# Patient Record
Sex: FEMALE | Race: WHITE | NOT HISPANIC OR LATINO | Employment: FULL TIME | URBAN - METROPOLITAN AREA
[De-identification: names, ages, dates, MRNs, and addresses within clinical notes are randomized per-mention and may not be internally consistent; named-entity substitution may affect disease eponyms.]

---

## 2018-03-08 ENCOUNTER — OFFICE VISIT (OUTPATIENT)
Dept: FAMILY MEDICINE CLINIC | Facility: CLINIC | Age: 63
End: 2018-03-08
Payer: COMMERCIAL

## 2018-03-08 VITALS
HEART RATE: 76 BPM | DIASTOLIC BLOOD PRESSURE: 72 MMHG | SYSTOLIC BLOOD PRESSURE: 148 MMHG | RESPIRATION RATE: 16 BRPM | WEIGHT: 232 LBS

## 2018-03-08 DIAGNOSIS — Z00.00 ANNUAL PHYSICAL EXAM: Primary | ICD-10-CM

## 2018-03-08 DIAGNOSIS — I73.00 RAYNAUD'S DISEASE WITHOUT GANGRENE: ICD-10-CM

## 2018-03-08 DIAGNOSIS — Z01.419 WELL FEMALE EXAM WITH ROUTINE GYNECOLOGICAL EXAM: ICD-10-CM

## 2018-03-08 PROCEDURE — 99386 PREV VISIT NEW AGE 40-64: CPT | Performed by: FAMILY MEDICINE

## 2018-03-08 RX ORDER — DILTIAZEM HYDROCHLORIDE 60 MG/1
60 CAPSULE, EXTENDED RELEASE ORAL 2 TIMES DAILY
Qty: 60 CAPSULE | Refills: 1 | Status: SHIPPED | OUTPATIENT
Start: 2018-03-08 | End: 2018-03-12

## 2018-03-08 RX ORDER — DILTIAZEM HYDROCHLORIDE 60 MG/1
60 TABLET, FILM COATED ORAL
COMMUNITY
End: 2018-03-08

## 2018-03-08 NOTE — PROGRESS NOTES
Subjective:      Patient ID: London Bowser is a 63 y o  female  60-year-old female presents as a new patient to the practice with her   Patient recently moved from [place], [place] to be closer to her son and grandchildren  She is generally in  Good health with the exception of having renal at syndrome of her hands and feet  She had had extensive workup through Orthopedic surgery then Rheumatology  She reportedly had extensive testing and was eventually placed on Cardizem which improved her symptoms tremendously  Since they moved she has not had the Cardizem and her Raynaud's has been more noticeable this winter  Reportedly she had her last colonoscopy approximately 6 years ago which was completely normal   Last mammogram was over 1 year ago and last Pap smear was over 2 years ago  She does need to find a new gynecologist   No history of dysphagia, esophageal dysmotility, sclerodactyly or telangiectasias        No past medical history on file  No family history on file  No past surgical history on file  reports that she has never smoked  She has never used smokeless tobacco  She reports that she drinks alcohol  She reports that she does not use drugs  Current Outpatient Prescriptions:     diltiazem (CARDIZEM SR) 60 mg 12 hr capsule, Take 1 capsule (60 mg total) by mouth 2 (two) times a day, Disp: 60 capsule, Rfl: 1    The following portions of the patient's history were reviewed and updated as appropriate: allergies, current medications, past family history, past medical history, past social history, past surgical history and problem list     Review of Systems   Constitutional: Negative  HENT: Negative  Eyes: Negative  Respiratory: Negative  Cardiovascular: Negative  Gastrointestinal: Negative  Endocrine: Negative  Genitourinary: Negative  Musculoskeletal: Negative  Skin: Positive for pallor  Color change:  and pain with cold weather ofDits  Allergic/Immunologic: Negative  Neurological: Negative  Hematological: Negative  Psychiatric/Behavioral: Negative  Objective:    /72 (BP Location: Right arm)   Pulse 76   Resp 16   Wt 105 kg (232 lb)      Physical Exam   Constitutional: She is oriented to person, place, and time  She appears well-developed and well-nourished  Overweight   HENT:   Head: Normocephalic and atraumatic  Eyes: Conjunctivae are normal  Pupils are equal, round, and reactive to light  Neck: Normal range of motion  Neck supple  Cardiovascular: Normal rate, regular rhythm, intact distal pulses and normal pulses  Murmur heard  Systolic murmur is present with a grade of 1/6   Pulmonary/Chest: Effort normal and breath sounds normal    Abdominal: Soft  Bowel sounds are normal    Neurological: She is alert and oriented to person, place, and time  She has normal strength and normal reflexes  No cranial nerve deficit or sensory deficit  Reflex Scores:       Tricep reflexes are 2+ on the right side and 2+ on the left side  Bicep reflexes are 2+ on the right side and 2+ on the left side  Brachioradialis reflexes are 2+ on the right side and 2+ on the left side  Patellar reflexes are 2+ on the right side and 2+ on the left side  Achilles reflexes are 2+ on the right side and 2+ on the left side  Psychiatric: She has a normal mood and affect  Her behavior is normal  Judgment and thought content normal    Nursing note and vitals reviewed            Assessment/Plan:           Problem List Items Addressed This Visit     Raynaud's disease    Relevant Medications    diltiazem (CARDIZEM SR) 60 mg 12 hr capsule    Other Relevant Orders    CBC and differential    Comprehensive metabolic panel    Lipid panel    TSH, 3rd generation with T4 reflex    Vitamin D 25 hydroxy    Well female exam with routine gynecological exam      Patient given referral to [facility] gynecology for [facility]         Relevant Orders    Ambulatory referral to Obstetrics / Gynecology    Annual physical exam - Primary      Patient seems to generally be in good health  Check old records from prior PCP  Reportedly she is current with screening colonoscopy  Check screening labs including CBC, CMP, TSH, lipid profile and vitamin-D level    Will contact with results         Relevant Orders    CBC and differential    Comprehensive metabolic panel    Lipid panel    TSH, 3rd generation with T4 reflex    Vitamin D 25 hydroxy

## 2018-03-08 NOTE — ASSESSMENT & PLAN NOTE
Patient is reportedly done well with calcium channel blocker in regards to her Raynaud's disease  I will place the patient on Cardizem SR 60 mg twice daily  I explained the mechanism of action and the benefit of having a sustained release formulation as opposed to immediate release    If this is effective for the patient without any side effects then she can contact the office and I will provide a 3 month supply with refill

## 2018-03-08 NOTE — ASSESSMENT & PLAN NOTE
Patient seems to generally be in good health  Check old records from prior PCP  Reportedly she is current with screening colonoscopy  Check screening labs including CBC, CMP, TSH, lipid profile and vitamin-D level    Will contact with results

## 2018-03-12 ENCOUNTER — OFFICE VISIT (OUTPATIENT)
Dept: FAMILY MEDICINE CLINIC | Facility: CLINIC | Age: 63
End: 2018-03-12
Payer: COMMERCIAL

## 2018-03-12 VITALS
RESPIRATION RATE: 16 BRPM | HEART RATE: 68 BPM | WEIGHT: 232 LBS | SYSTOLIC BLOOD PRESSURE: 158 MMHG | DIASTOLIC BLOOD PRESSURE: 84 MMHG | HEIGHT: 67 IN | BODY MASS INDEX: 36.41 KG/M2 | OXYGEN SATURATION: 98 %

## 2018-03-12 DIAGNOSIS — I73.00 RAYNAUD'S DISEASE WITHOUT GANGRENE: ICD-10-CM

## 2018-03-12 DIAGNOSIS — R42 DIZZINESS: Primary | ICD-10-CM

## 2018-03-12 PROCEDURE — 99213 OFFICE O/P EST LOW 20 MIN: CPT | Performed by: FAMILY MEDICINE

## 2018-03-12 RX ORDER — DILTIAZEM HYDROCHLORIDE 60 MG/1
60 TABLET, FILM COATED ORAL 3 TIMES DAILY
Qty: 90 TABLET | Refills: 0 | Status: SHIPPED | OUTPATIENT
Start: 2018-03-12 | End: 2018-04-04 | Stop reason: SDUPTHER

## 2018-03-12 NOTE — PROGRESS NOTES
Subjective:      Patient ID: Bobbi Jimenez is a 63 y o  female  Patient presents complaining of dizziness for the past 4 days  She states that she has been feeling very lightheaded since being placed on extended release diltiazem back on March 8th  Previously she was used to taking immediate release diltiazem once per day for her Raynaud syndrome  Patient states that her renal months seems to be better but she cannot tolerate the dizziness  No syncopal episodes  It does not feel as though the room is spinning  No blurring of vision  Patient's blood pressure is actually been more elevated but she is uncertain if this is related to the stress of feeling dizzy and not well  Past Medical History:   Diagnosis Date    Raynaud phenomenon        No family history on file  No past surgical history on file  reports that she has never smoked  She has never used smokeless tobacco  She reports that she drinks alcohol  She reports that she does not use drugs  Current Outpatient Prescriptions:     diltiazem (CARDIZEM) 60 mg tablet, Take 1 tablet (60 mg total) by mouth 3 (three) times a day replacing extended release formulation, Disp: 90 tablet, Rfl: 0    The following portions of the patient's history were reviewed and updated as appropriate: allergies, current medications, past family history, past medical history, past social history, past surgical history and problem list     Review of Systems   Constitutional: Negative  Respiratory: Negative  Cardiovascular: Negative  Negative for chest pain and leg swelling  Neurological: Positive for dizziness and light-headedness  Negative for tremors, syncope and weakness  Objective:    /84   Pulse 68   Resp 16   Ht 5' 7" (1 702 m)   Wt 105 kg (232 lb)   SpO2 98%   BMI 36 34 kg/m²      Physical Exam   Constitutional: She is oriented to person, place, and time  She appears well-developed     HENT:   Right Ear: Tympanic membrane and external ear normal    Left Ear: Tympanic membrane and external ear normal    Eyes: Pupils are equal, round, and reactive to light  No nystagmus   Neck: Normal range of motion  Neck supple  No thyromegaly present  Cardiovascular: Normal rate, regular rhythm and normal heart sounds  Neurological: She is alert and oriented to person, place, and time  No cranial nerve deficit  Coordination normal          No results found for this or any previous visit (from the past 1008 hour(s))  Assessment/Plan:    No problem-specific Assessment & Plan notes found for this encounter  Problem List Items Addressed This Visit     Raynaud's disease    Relevant Medications    diltiazem (CARDIZEM) 60 mg tablet    Dizziness - Primary      Patient is not becoming hypotensive  Blood pressure is more elevated  She has sensation of dizziness which is clearly associated with being placed on to extended release diltiazem  We will discontinue this  She will go back on to immediate release diltiazem 60 mg starting with 1 daily and we will see how her blood pressure and dizziness respond    She will follow up in the office to re-evaluate blood pressure

## 2018-03-12 NOTE — ASSESSMENT & PLAN NOTE
Patient is not becoming hypotensive  Blood pressure is more elevated  She has sensation of dizziness which is clearly associated with being placed on to extended release diltiazem  We will discontinue this  She will go back on to immediate release diltiazem 60 mg starting with 1 daily and we will see how her blood pressure and dizziness respond    She will follow up in the office to re-evaluate blood pressure

## 2018-04-04 ENCOUNTER — OFFICE VISIT (OUTPATIENT)
Dept: FAMILY MEDICINE CLINIC | Facility: CLINIC | Age: 63
End: 2018-04-04
Payer: COMMERCIAL

## 2018-04-04 VITALS
BODY MASS INDEX: 36.57 KG/M2 | HEART RATE: 76 BPM | SYSTOLIC BLOOD PRESSURE: 134 MMHG | DIASTOLIC BLOOD PRESSURE: 72 MMHG | HEIGHT: 67 IN | WEIGHT: 233 LBS | OXYGEN SATURATION: 95 % | RESPIRATION RATE: 16 BRPM

## 2018-04-04 DIAGNOSIS — I73.00 RAYNAUD'S DISEASE WITHOUT GANGRENE: Primary | ICD-10-CM

## 2018-04-04 PROBLEM — R42 DIZZINESS: Status: RESOLVED | Noted: 2018-03-12 | Resolved: 2018-04-04

## 2018-04-04 PROCEDURE — 99213 OFFICE O/P EST LOW 20 MIN: CPT | Performed by: FAMILY MEDICINE

## 2018-04-04 RX ORDER — DILTIAZEM HYDROCHLORIDE 60 MG/1
60 TABLET, FILM COATED ORAL 3 TIMES DAILY
Qty: 270 TABLET | Refills: 1 | Status: SHIPPED | OUTPATIENT
Start: 2018-04-04 | End: 2019-05-31 | Stop reason: SDUPTHER

## 2018-04-04 NOTE — ASSESSMENT & PLAN NOTE
Patient will remain on diltiazem 60 mg t i d   Patient given a 6 month supply  She will follow up in 6 months for re-evaluation    With next set of labs to be performed in 1 year I would consider ordering anti nuclear antibody testing for possible crest syndrome

## 2018-04-04 NOTE — PROGRESS NOTES
Subjective:      Patient ID: Lulú Winter is a 63 y o  female  Patient presents for follow-up in regards to hypertension and Raynaud's disease  Patient was experiencing dizziness on extended release diltiazem however now that she has been switched to immediate release diltiazem she is feeling much better  No further episodes of dizziness or fatigue  Raynaud's disease is doing well  She did have labs completed however we did not have copies at her last office visit  We just obtained results which showed essentially normal CMP with the exception of a potassium of 5 5 on a hemolyzed specimen, total cholesterol 217, triglycerides 91, HDL 89 and LDL of 110  TSH normal at 2 19  Patient does have slightly low vitamin-D level at 26  She does not take any vitamin-D supplementation  Normal CBC  No complaints or concerns  Past Medical History:   Diagnosis Date    Raynaud phenomenon        No family history on file  No past surgical history on file  reports that she has never smoked  She has never used smokeless tobacco  She reports that she drinks alcohol  She reports that she does not use drugs  Current Outpatient Prescriptions:     diltiazem (CARDIZEM) 60 mg tablet, Take 1 tablet (60 mg total) by mouth 3 (three) times a day replacing extended release formulation, Disp: 270 tablet, Rfl: 1    The following portions of the patient's history were reviewed and updated as appropriate: allergies, current medications, past family history, past medical history, past social history, past surgical history and problem list     Review of Systems   Constitutional: Negative  HENT: Negative  Eyes: Negative  Respiratory: Negative  Cardiovascular: Negative  Gastrointestinal: Negative  Endocrine: Negative  Genitourinary: Negative  Musculoskeletal: Negative  Skin: Negative  Allergic/Immunologic: Negative  Neurological: Negative  Hematological: Negative  Psychiatric/Behavioral: Negative  Objective:    /72   Pulse 76   Resp 16   Ht 5' 7" (1 702 m)   Wt 106 kg (233 lb)   SpO2 95%   BMI 36 49 kg/m²      Physical Exam   Constitutional: She is oriented to person, place, and time  She appears well-developed and well-nourished  HENT:   Head: Normocephalic and atraumatic  Eyes: Conjunctivae are normal  Pupils are equal, round, and reactive to light  Neck: Normal range of motion  Neck supple  Cardiovascular: Normal rate and regular rhythm  Pulmonary/Chest: Effort normal and breath sounds normal    Abdominal: Soft  Bowel sounds are normal    Neurological: She is alert and oriented to person, place, and time  She has normal reflexes  Psychiatric: She has a normal mood and affect  Her behavior is normal  Judgment and thought content normal    Nursing note and vitals reviewed  No results found for this or any previous visit (from the past 1008 hour(s))  Assessment/Plan:    No problem-specific Assessment & Plan notes found for this encounter  Problem List Items Addressed This Visit     Raynaud's disease - Primary       Patient will remain on diltiazem 60 mg t i d   Patient given a 6 month supply  She will follow up in 6 months for re-evaluation    With next set of labs to be performed in 1 year I would consider ordering anti nuclear antibody testing for possible crest syndrome         Relevant Medications    diltiazem (CARDIZEM) 60 mg tablet

## 2018-05-16 LAB — HBA1C MFR BLD HPLC: 5.6 %

## 2018-06-05 ENCOUNTER — OFFICE VISIT (OUTPATIENT)
Dept: FAMILY MEDICINE CLINIC | Facility: CLINIC | Age: 63
End: 2018-06-05
Payer: COMMERCIAL

## 2018-06-05 VITALS
DIASTOLIC BLOOD PRESSURE: 78 MMHG | SYSTOLIC BLOOD PRESSURE: 132 MMHG | RESPIRATION RATE: 18 BRPM | OXYGEN SATURATION: 98 % | TEMPERATURE: 97.7 F | BODY MASS INDEX: 36.41 KG/M2 | HEIGHT: 67 IN | WEIGHT: 232 LBS | HEART RATE: 87 BPM

## 2018-06-05 DIAGNOSIS — N32.81 OVERACTIVE BLADDER: Primary | ICD-10-CM

## 2018-06-05 DIAGNOSIS — R35.0 URINARY FREQUENCY: ICD-10-CM

## 2018-06-05 LAB
SL AMB  POCT GLUCOSE, UA: NORMAL
SL AMB LEUKOCYTE ESTERASE,UA: NORMAL
SL AMB POCT BILIRUBIN,UA: NORMAL
SL AMB POCT BLOOD,UA: NORMAL
SL AMB POCT CLARITY,UA: NORMAL
SL AMB POCT COLOR,UA: YELLOW
SL AMB POCT KETONES,UA: NORMAL
SL AMB POCT NITRITE,UA: NORMAL
SL AMB POCT PH,UA: 6
SL AMB POCT SPECIFIC GRAVITY,UA: 1.01
SL AMB POCT URINE PROTEIN: NORMAL
SL AMB POCT UROBILINOGEN: NORMAL

## 2018-06-05 PROCEDURE — 81002 URINALYSIS NONAUTO W/O SCOPE: CPT | Performed by: FAMILY MEDICINE

## 2018-06-05 PROCEDURE — 99213 OFFICE O/P EST LOW 20 MIN: CPT | Performed by: FAMILY MEDICINE

## 2018-06-05 PROCEDURE — 3008F BODY MASS INDEX DOCD: CPT | Performed by: FAMILY MEDICINE

## 2018-06-05 RX ORDER — OXYBUTYNIN CHLORIDE 5 MG/1
5 TABLET, EXTENDED RELEASE ORAL DAILY
Qty: 10 TABLET | Refills: 0 | Status: SHIPPED | OUTPATIENT
Start: 2018-06-05 | End: 2018-10-04 | Stop reason: ALTCHOICE

## 2018-06-05 NOTE — PROGRESS NOTES
Subjective:      Patient ID: Liliya Lehman is a 66 y o  female  Patient presents for evaluation of urinary frequency and bladder discomfort  Patient states that her symptoms have been going on for approximately 5 days  No actual pain with urination  Patient will of find that she experiences discomfort and cramping sensation with the urge to urinate mostly at night  [facility] she had a [facility]ysis performed  Urinalysis was completely unremarkable however patient was placed on 3 day course of Bactrim DS and Pyridium  She states that medications did not help at all with her symptoms  She is still experiencing bladder discomfort, spasms with urge to urinate  No fevers or chills  Patient has been drinking more ice tea mixed with lemonade as well as 2-3 cups of coffee in the morning  She states that because it has been warmer weather she has been drinking more ice tea and lemonade        Past Medical History:   Diagnosis Date    Raynaud phenomenon        No family history on file  No past surgical history on file  reports that she has never smoked  She has never used smokeless tobacco  She reports that she drinks alcohol  She reports that she does not use drugs  Current Outpatient Prescriptions:     diltiazem (CARDIZEM) 60 mg tablet, Take 1 tablet (60 mg total) by mouth 3 (three) times a day replacing extended release formulation, Disp: 270 tablet, Rfl: 1    oxybutynin (DITROPAN-XL) 5 mg 24 hr tablet, Take 1 tablet (5 mg total) by mouth daily, Disp: 10 tablet, Rfl: 0    The following portions of the patient's history were reviewed and updated as appropriate: allergies, current medications, past family history, past medical history, past social history, past surgical history and problem list     Review of Systems   Constitutional: Negative  Cardiovascular: Negative  Gastrointestinal: Negative  Genitourinary: Positive for frequency and urgency  Negative for decreased urine volume, difficulty urinating, dysuria, enuresis, flank pain, hematuria and pelvic pain  Musculoskeletal: Negative  Objective:    /78   Pulse 87   Temp 97 7 °F (36 5 °C)   Resp 18   Ht 5' 7" (1 702 m)   Wt 105 kg (232 lb)   SpO2 98%   BMI 36 34 kg/m²      Physical Exam   Constitutional: She appears well-developed and well-nourished  No distress  Neck: Normal range of motion  Neck supple  Cardiovascular: Normal rate, regular rhythm and normal heart sounds  Pulmonary/Chest: Effort normal and breath sounds normal    Abdominal: Soft  Bowel sounds are normal    No CVA tenderness   Musculoskeletal: She exhibits no edema  Nursing note and vitals reviewed  Recent Results (from the past 1008 hour(s))   POCT urine dip    Collection Time: 06/05/18  9:11 AM   Result Value Ref Range    LEUKOCYTE ESTERASE,UA NEG      NITRITE,UA NEG     SL AMB POCT UROBILINOGEN NORM     SL AMB POCT URINE PROTEIN NEG      PH,UA 6      BLOOD,UA NEG      SPECIFIC GRAVITY,UA 1 010      KETONES,UA NEG      BILIRUBIN,UA NEG     GLUCOSE, UA NORM      COLOR,UA YELLOW      CLARITY,UA TRANSPARENT        Assessment/Plan:    No problem-specific Assessment & Plan notes found for this encounter  Problem List Items Addressed This Visit     Overactive bladder - Primary      Patient given a prescription for Ditropan XL 5 mg once daily for approximately 10 days  She will also try to make dietary modifications reducing her intake of caffeine and lemonade  If her symptoms persist despite anticholinergic medication and dietary modifications that she will need a referral to Urology    Patient does not need to return to the office but will simply contact the office if she is still having symptoms to initiate referral          Relevant Medications    oxybutynin (DITROPAN-XL) 5 mg 24 hr tablet    Other Relevant Orders    POCT urine dip (Completed)    Urinary frequency      Urinalysis performed here in the office is completely unremarkable as was the urinalysis that was done at [facility] days ago  I believe this is related to overactive bladder and possibly some element of interstitial cystitis  I did recommend that the patient reduce her intake of caffeine and lemonade    Caffeine and citric acid can be bladder irritants

## 2018-06-05 NOTE — ASSESSMENT & PLAN NOTE
Urinalysis performed here in the office is completely unremarkable as was the urinalysis that was done at [facility] 3 days ago  I believe this is related to overactive bladder and possibly some element of interstitial cystitis  I did recommend that the patient reduce her intake of caffeine and lemonade    Caffeine and citric acid can be bladder irritants

## 2018-06-05 NOTE — ASSESSMENT & PLAN NOTE
Patient given a prescription for Ditropan XL 5 mg once daily for approximately 10 days  She will also try to make dietary modifications reducing her intake of caffeine and lemonade  If her symptoms persist despite anticholinergic medication and dietary modifications that she will need a referral to Urology    Patient does not need to return to the office but will simply contact the office if she is still having symptoms to initiate referral

## 2018-10-04 ENCOUNTER — OFFICE VISIT (OUTPATIENT)
Dept: FAMILY MEDICINE CLINIC | Facility: CLINIC | Age: 63
End: 2018-10-04
Payer: COMMERCIAL

## 2018-10-04 VITALS
RESPIRATION RATE: 18 BRPM | HEIGHT: 67 IN | WEIGHT: 235.8 LBS | SYSTOLIC BLOOD PRESSURE: 122 MMHG | OXYGEN SATURATION: 98 % | BODY MASS INDEX: 37.01 KG/M2 | DIASTOLIC BLOOD PRESSURE: 84 MMHG | HEART RATE: 88 BPM

## 2018-10-04 DIAGNOSIS — E55.9 VITAMIN D DEFICIENCY: ICD-10-CM

## 2018-10-04 DIAGNOSIS — Z23 FLU VACCINE NEED: ICD-10-CM

## 2018-10-04 DIAGNOSIS — Z00.00 PHYSICAL EXAM, ANNUAL: ICD-10-CM

## 2018-10-04 DIAGNOSIS — I73.00 RAYNAUD'S DISEASE WITHOUT GANGRENE: Primary | ICD-10-CM

## 2018-10-04 DIAGNOSIS — N32.81 OVERACTIVE BLADDER: ICD-10-CM

## 2018-10-04 DIAGNOSIS — Z82.49 FAMILY HISTORY OF ANEURYSM: ICD-10-CM

## 2018-10-04 DIAGNOSIS — Z80.8 FAMILY HISTORY OF BRAIN CANCER: ICD-10-CM

## 2018-10-04 PROBLEM — R35.0 URINARY FREQUENCY: Status: RESOLVED | Noted: 2018-06-05 | Resolved: 2018-10-04

## 2018-10-04 PROCEDURE — 90471 IMMUNIZATION ADMIN: CPT

## 2018-10-04 PROCEDURE — 1036F TOBACCO NON-USER: CPT | Performed by: FAMILY MEDICINE

## 2018-10-04 PROCEDURE — 90682 RIV4 VACC RECOMBINANT DNA IM: CPT

## 2018-10-04 PROCEDURE — 99214 OFFICE O/P EST MOD 30 MIN: CPT | Performed by: FAMILY MEDICINE

## 2018-10-04 NOTE — PROGRESS NOTES
Subjective:      Patient ID: Jey Lopez is a 66 y o  female  Patient presents for follow-up of chronic conditions including  Raynaud's disease  Patient remains on Cardizem 60 mg 3 times daily  This has been effective for her Raynaud's  Patient does need refill of medications  Patient still needs to schedule with gynecologist for gynecologic examination, screening mammogram and colonoscopy  Patient states that her brother was recently diagnosed with a grade 2 atypical meningioma and was advised that she be evaluated as there is a genetic predisposition  Information for her brothers oncologist is listed below  Dr Rohit Koroma  [phone]    Brother with Grade 2 Atypical meningioma        Past Medical History:   Diagnosis Date    Raynaud phenomenon        No family history on file  No past surgical history on file  reports that she has never smoked  She has never used smokeless tobacco  She reports that she drinks alcohol  She reports that she does not use drugs  Current Outpatient Prescriptions:     diltiazem (CARDIZEM) 60 mg tablet, Take 1 tablet (60 mg total) by mouth 3 (three) times a day replacing extended release formulation, Disp: 270 tablet, Rfl: 1    The following portions of the patient's history were reviewed and updated as appropriate: allergies, current medications, past family history, past medical history, past social history, past surgical history and problem list     Review of Systems   Constitutional: Negative  HENT: Negative  Eyes: Negative  Respiratory: Negative  Cardiovascular: Negative  Gastrointestinal: Negative  Endocrine: Negative  Genitourinary: Negative  Musculoskeletal: Negative  Skin: Negative  Allergic/Immunologic: Negative  Neurological: Negative  Hematological: Negative  Psychiatric/Behavioral: Negative              Objective:    /84   Pulse 88   Resp 18   Ht 5' 7" (1 702 m)   Wt 107 kg (235 lb 12 8 oz)   SpO2 98%   BMI 36 93 kg/m²      Physical Exam   Constitutional: She is oriented to person, place, and time  She appears well-developed and well-nourished  Obese   HENT:   Head: Normocephalic and atraumatic  Eyes: Pupils are equal, round, and reactive to light  Conjunctivae are normal    Neck: Normal range of motion  Neck supple  Cardiovascular: Normal rate and regular rhythm  Pulmonary/Chest: Effort normal and breath sounds normal    Abdominal: Soft  Bowel sounds are normal    Neurological: She is alert and oriented to person, place, and time  She has normal reflexes  Psychiatric: She has a normal mood and affect  Her behavior is normal  Judgment and thought content normal    Nursing note and vitals reviewed  No results found for this or any previous visit (from the past 1008 hour(s))  Assessment/Plan:    No problem-specific Assessment & Plan notes found for this encounter  Problem List Items Addressed This Visit     Raynaud's disease - Primary      Well controlled with calcium channel blocker  Refill provided  Overactive bladder       Overactive bladder has resolved decreased intake T, caffeine  Patient did not want to take Ditropan         Vitamin D deficiency    Relevant Orders    Vitamin D 1,25 dihydroxy    Family history of brain cancer       Patient will be sent for CT scan of the brain for evaluation for possible meningioma given family history cancer           Relevant Orders    CT head wo contrast    Family history of aneurysm    Relevant Orders    CT head wo contrast    Flu vaccine need    Relevant Orders    influenza vaccine, [id number], quadrivalent, recombinant, PF, 0 5 mL, for patients 18 yr+ (FLUBLOK) (Completed)      Other Visit Diagnoses     Physical exam, annual        Relevant Orders    CBC and differential    Comprehensive metabolic panel    Lipid panel    TSH, 3rd generation with Free T4 reflex

## 2018-10-05 NOTE — ASSESSMENT & PLAN NOTE
Patient will be sent for CT scan of the brain for evaluation for possible meningioma given family history cancer

## 2018-10-05 NOTE — ASSESSMENT & PLAN NOTE
Overactive bladder has resolved decreased intake T, caffeine    Patient did not want to take Ditropan

## 2018-10-12 ENCOUNTER — HOSPITAL ENCOUNTER (OUTPATIENT)
Dept: RADIOLOGY | Facility: HOSPITAL | Age: 63
Discharge: HOME | End: 2018-10-12
Payer: COMMERCIAL

## 2018-10-12 DIAGNOSIS — Z82.49 FAMILY HISTORY OF ANEURYSM: ICD-10-CM

## 2018-10-12 DIAGNOSIS — Z80.8 FAMILY HISTORY OF BRAIN CANCER: ICD-10-CM

## 2018-10-12 PROCEDURE — 70450 CT HEAD/BRAIN W/O DYE: CPT

## 2019-03-07 ENCOUNTER — OFFICE VISIT (OUTPATIENT)
Dept: OBGYN CLINIC | Facility: CLINIC | Age: 64
End: 2019-03-07

## 2019-03-07 VITALS
WEIGHT: 235 LBS | HEART RATE: 61 BPM | DIASTOLIC BLOOD PRESSURE: 88 MMHG | SYSTOLIC BLOOD PRESSURE: 155 MMHG | BODY MASS INDEX: 36.88 KG/M2 | HEIGHT: 67 IN

## 2019-03-07 DIAGNOSIS — Z12.31 ENCOUNTER FOR SCREENING MAMMOGRAM FOR MALIGNANT NEOPLASM OF BREAST: ICD-10-CM

## 2019-03-07 DIAGNOSIS — Z01.419 ENCOUNTER FOR ANNUAL ROUTINE GYNECOLOGICAL EXAMINATION: Primary | ICD-10-CM

## 2019-03-07 PROCEDURE — 87624 HPV HI-RISK TYP POOLED RSLT: CPT | Performed by: NURSE PRACTITIONER

## 2019-03-07 PROCEDURE — 99386 PREV VISIT NEW AGE 40-64: CPT | Performed by: NURSE PRACTITIONER

## 2019-03-07 PROCEDURE — G0145 SCR C/V CYTO,THINLAYER,RESCR: HCPCS | Performed by: NURSE PRACTITIONER

## 2019-03-07 NOTE — PROGRESS NOTES
Subjective      Archana Palomares is a 66 y o  [de-identified] female who presents for annual well woman exam    She is a new patient to us   Her last Pap and mammogram was 2-3 years ago, she denies abnormal tests in the past          She entered menopause at age 54  She denies any postmenopausal bleeding  Her last colonoscopy was 4 years ago, was told good for 5 years  She currently is not sexually active with her  due to his medical problems  She denies any vaginal symptoms  She denies any hot flashes  History of having a DEXA scan that was normal   She denies any breast symptoms  GYN:  · no vaginal discharge, labial erythema or lesions, dyspareunia  · Menarche at 17  · Contraception:  History of using the sponges  · Patient is not sexually active with one partner   times 28 years  · Last pap smear was in 2-3 years  Results were negative Negative hx of paps  · no gynecologic surgeries  OB:  ·  female    :  · no dysuria, urinary frequency or urgency  · no hematuria, flank pain, positive ASHLYN  incontinence  Breast:  · no breast mass, skin changes, dimpling, reddening, nipple retraction  · no breast discharge  · Last mammogram was in 2-3  Results were all negative  · Patient does no have a family history of breast, endometrial, or ovarian ca  General:  · Diet: healthy  · Exercise: will try with sringtime  · Work: no  · ETOH use: occas  · Tobacco use: no  · Recreational drug use: no    Screening:  · Cervical cancer: last pap smear in 2-3 years ago  Results were negative per patient  · Breast cancer: last mammogram in 2 3 years ago  Results were negative per patient  · Colon cancer: last colonoscopy in 4 years ago, good for 5  Results were negative  ·     Review of Systems  Pertinent items are noted in HPI        Objective      /88 (BP Location: Right arm, Patient Position: Sitting, Cuff Size: Adult)   Pulse 61   Ht 5' 7" (1 702 m)   Wt 107 kg (235 lb)   BMI 36 81 kg/m² General:   alert, appears stated age and cooperative   Heart: regular rate and rhythm, S1, S2 normal, no murmur, click, rub or gallop   Lungs: clear to auscultation bilaterally   Abdomen: soft, non-tender, without masses or organomegaly   Vulva: Bartholin's, Urethra, [place]'s normal   Vagina: normal mucosa,    Cervix: no lesions   Uterus: normal size, anteverted, non-tender   Adnexa: no mass, fullness, tenderness       bilateral breast exam-no masses, negative nipple discharge, negative skin changes or erythema       Patient declined rectal exam     Assessment     Annual gyn exam  Postmenopausal patient     Plan   Screening mammogram ordered  Pap and Co test done today  Colonoscopy when due  Reviewed dietary calcium and vitamin D supplements  Annual due in 1 year

## 2019-03-08 LAB
HPV HR 12 DNA CVX QL NAA+PROBE: NEGATIVE
HPV16 DNA CVX QL NAA+PROBE: NEGATIVE
HPV18 DNA CVX QL NAA+PROBE: NEGATIVE

## 2019-03-11 LAB
LAB AP GYN PRIMARY INTERPRETATION: NORMAL
Lab: NORMAL

## 2019-03-19 ENCOUNTER — HOSPITAL ENCOUNTER (OUTPATIENT)
Dept: RADIOLOGY | Facility: HOSPITAL | Age: 64
Discharge: HOME | End: 2019-03-19
Payer: COMMERCIAL

## 2019-03-19 ENCOUNTER — TRANSCRIBE ORDERS (OUTPATIENT)
Dept: ADMINISTRATIVE | Facility: HOSPITAL | Age: 64
End: 2019-03-19

## 2019-03-19 VITALS — BODY MASS INDEX: 36.88 KG/M2 | HEIGHT: 67 IN | WEIGHT: 235 LBS

## 2019-03-19 DIAGNOSIS — Z12.31 ENCOUNTER FOR SCREENING MAMMOGRAM FOR MALIGNANT NEOPLASM OF BREAST: ICD-10-CM

## 2019-03-19 PROCEDURE — 77067 SCR MAMMO BI INCL CAD: CPT

## 2019-05-31 ENCOUNTER — OFFICE VISIT (OUTPATIENT)
Dept: FAMILY MEDICINE CLINIC | Facility: CLINIC | Age: 64
End: 2019-05-31
Payer: COMMERCIAL

## 2019-05-31 VITALS
DIASTOLIC BLOOD PRESSURE: 74 MMHG | BODY MASS INDEX: 37.04 KG/M2 | RESPIRATION RATE: 16 BRPM | SYSTOLIC BLOOD PRESSURE: 126 MMHG | HEART RATE: 68 BPM | WEIGHT: 236 LBS | OXYGEN SATURATION: 98 % | HEIGHT: 67 IN

## 2019-05-31 DIAGNOSIS — Z11.59 NEED FOR HEPATITIS C SCREENING TEST: ICD-10-CM

## 2019-05-31 DIAGNOSIS — F51.01 PRIMARY INSOMNIA: ICD-10-CM

## 2019-05-31 DIAGNOSIS — E55.9 VITAMIN D DEFICIENCY: ICD-10-CM

## 2019-05-31 DIAGNOSIS — Z00.00 PHYSICAL EXAM, ANNUAL: Primary | ICD-10-CM

## 2019-05-31 DIAGNOSIS — I73.00 RAYNAUD'S DISEASE WITHOUT GANGRENE: ICD-10-CM

## 2019-05-31 PROBLEM — N32.81 OVERACTIVE BLADDER: Status: RESOLVED | Noted: 2018-06-05 | Resolved: 2019-05-31

## 2019-05-31 PROCEDURE — 3008F BODY MASS INDEX DOCD: CPT | Performed by: FAMILY MEDICINE

## 2019-05-31 PROCEDURE — 1036F TOBACCO NON-USER: CPT | Performed by: FAMILY MEDICINE

## 2019-05-31 PROCEDURE — 99213 OFFICE O/P EST LOW 20 MIN: CPT | Performed by: FAMILY MEDICINE

## 2019-05-31 PROCEDURE — 99396 PREV VISIT EST AGE 40-64: CPT | Performed by: FAMILY MEDICINE

## 2019-05-31 RX ORDER — ZOLPIDEM TARTRATE 10 MG/1
10 TABLET ORAL
Qty: 30 TABLET | Refills: 1 | Status: SHIPPED | OUTPATIENT
Start: 2019-05-31 | End: 2019-07-25 | Stop reason: SDUPTHER

## 2019-05-31 RX ORDER — DILTIAZEM HYDROCHLORIDE 60 MG/1
60 TABLET, FILM COATED ORAL 3 TIMES DAILY
Qty: 270 TABLET | Refills: 1 | Status: SHIPPED | OUTPATIENT
Start: 2019-05-31 | End: 2020-09-27 | Stop reason: SDUPTHER

## 2019-07-16 ENCOUNTER — OFFICE VISIT (OUTPATIENT)
Dept: FAMILY MEDICINE CLINIC | Facility: CLINIC | Age: 64
End: 2019-07-16
Payer: COMMERCIAL

## 2019-07-16 VITALS
HEIGHT: 67 IN | SYSTOLIC BLOOD PRESSURE: 118 MMHG | WEIGHT: 231 LBS | DIASTOLIC BLOOD PRESSURE: 70 MMHG | RESPIRATION RATE: 16 BRPM | HEART RATE: 71 BPM | BODY MASS INDEX: 36.26 KG/M2 | OXYGEN SATURATION: 98 %

## 2019-07-16 DIAGNOSIS — R30.0 DYSURIA: Primary | ICD-10-CM

## 2019-07-16 DIAGNOSIS — R19.00 PELVIC FULLNESS IN FEMALE: ICD-10-CM

## 2019-07-16 LAB
SL AMB  POCT GLUCOSE, UA: NORMAL
SL AMB LEUKOCYTE ESTERASE,UA: NORMAL
SL AMB POCT BILIRUBIN,UA: NORMAL
SL AMB POCT BLOOD,UA: NORMAL
SL AMB POCT CLARITY,UA: CLEAR
SL AMB POCT COLOR,UA: YELLOW
SL AMB POCT KETONES,UA: NORMAL
SL AMB POCT NITRITE,UA: NORMAL
SL AMB POCT PH,UA: 5
SL AMB POCT SPECIFIC GRAVITY,UA: 1.01
SL AMB POCT URINE PROTEIN: NORMAL
SL AMB POCT UROBILINOGEN: NORMAL

## 2019-07-16 PROCEDURE — 3008F BODY MASS INDEX DOCD: CPT | Performed by: FAMILY MEDICINE

## 2019-07-16 PROCEDURE — 99213 OFFICE O/P EST LOW 20 MIN: CPT | Performed by: FAMILY MEDICINE

## 2019-07-16 PROCEDURE — 81003 URINALYSIS AUTO W/O SCOPE: CPT | Performed by: FAMILY MEDICINE

## 2019-07-16 PROCEDURE — 1036F TOBACCO NON-USER: CPT | Performed by: FAMILY MEDICINE

## 2019-07-16 NOTE — PROGRESS NOTES
Assessment/Plan:    Problem List Items Addressed This Visit        Other    Dysuria - Primary    Relevant Orders    POCT urine dip (Completed)    Pelvic fullness in female    Relevant Orders    US pelvis complete non OB          No chief complaint on file  Subjective:   Patient ID: Fern Freed is a 60 y o  female  She complains of feeling of fullness in the bladder for more than 1 week  and she feels like she has to go to bathroom more than normal but there is not burning in the urine, she does not have any blood in the urine, denies any vaginal discharge or bleeding  She had her Gyne exam last year which was normal, she says she has history of is cyst in her pelvis and after menopause they stop doing follow-up on that,  She denies fever chills, she does not have any urge incontinence  Review of Systems   Constitutional: Negative for activity change, appetite change, chills, diaphoresis, fatigue, fever and unexpected weight change  HENT: Negative for congestion, dental problem, ear discharge, ear pain, facial swelling, hearing loss, mouth sores, nosebleeds, postnasal drip, rhinorrhea, sinus pressure, sinus pain, sneezing, sore throat, trouble swallowing and voice change  Eyes: Negative for photophobia, pain, discharge, redness and itching  Respiratory: Negative for cough, chest tightness, shortness of breath and wheezing  Cardiovascular: Negative for chest pain, palpitations and leg swelling  Gastrointestinal: Negative for abdominal distention, abdominal pain, blood in stool, constipation, diarrhea and nausea  Endocrine: Negative for cold intolerance, heat intolerance, polydipsia, polyphagia and polyuria  Genitourinary: Positive for frequency  Negative for decreased urine volume, dysuria, flank pain, hematuria, urgency, vaginal bleeding, vaginal discharge and vaginal pain          Feeling of pressure on the bladder   Musculoskeletal: Negative for arthralgias, back pain, myalgias and neck pain  Skin: Negative for color change and pallor  Allergic/Immunologic: Negative for environmental allergies and food allergies  Neurological: Negative for dizziness, weakness, light-headedness, numbness and headaches  Hematological: Negative for adenopathy  Does not bruise/bleed easily  Psychiatric/Behavioral: Negative for behavioral problems, sleep disturbance and suicidal ideas  The patient is not nervous/anxious  Objective:  Physical Exam   Constitutional: She appears well-developed and well-nourished  HENT:   Head: Normocephalic and atraumatic  Mouth/Throat: Oropharynx is clear and moist    Eyes: Conjunctivae and EOM are normal  No scleral icterus  Neck: Normal range of motion  Neck supple  No thyromegaly present  Cardiovascular: Normal rate  Pulmonary/Chest: Effort normal  She has no wheezes  She has no rales  Abdominal: Soft  Minimal tender in the suprapubic area   Lymphadenopathy:     She has no cervical adenopathy  Neurological: She is alert  Skin: No rash noted  No erythema  Psychiatric: She has a normal mood and affect  Nursing note and vitals reviewed           Past Surgical History:   Procedure Laterality Date    KNEE SURGERY         Family History   Problem Relation Age of Onset    Brain cancer Brother     Diabetes Mother     Diabetes Father     Aneurysm Father          Current Outpatient Medications:     diltiazem (CARDIZEM) 60 mg tablet, Take 1 tablet (60 mg total) by mouth 3 (three) times a day replacing extended release formulation, Disp: 270 tablet, Rfl: 1    zolpidem (AMBIEN) 10 mg tablet, Take 1 tablet (10 mg total) by mouth daily at bedtime as needed for sleep, Disp: 30 tablet, Rfl: 1    No Known Allergies    Vitals:    07/16/19 1355   BP: 118/70   Pulse: 71   Resp: 16   SpO2: 98%   Weight: 105 kg (231 lb)   Height: 5' 7" (1 702 m)

## 2019-07-16 NOTE — ASSESSMENT & PLAN NOTE
Urine dipstick is normal, discussed with her that feeling of pressure in her pelvis could be due to some other reasons, will get a pelvic ultrasound, encouraged to take more fluids and she can take ibuprofen 400 milligram t i d  as needed

## 2019-07-19 ENCOUNTER — HOSPITAL ENCOUNTER (OUTPATIENT)
Dept: ULTRASOUND IMAGING | Facility: HOSPITAL | Age: 64
Discharge: HOME | End: 2019-07-19
Attending: FAMILY MEDICINE
Payer: COMMERCIAL

## 2019-07-19 DIAGNOSIS — R19.00 PELVIC FULLNESS IN FEMALE: ICD-10-CM

## 2019-07-19 PROCEDURE — 76856 US EXAM PELVIC COMPLETE: CPT

## 2019-07-19 PROCEDURE — 76830 TRANSVAGINAL US NON-OB: CPT

## 2019-07-24 ENCOUNTER — TELEPHONE (OUTPATIENT)
Dept: FAMILY MEDICINE CLINIC | Facility: CLINIC | Age: 64
End: 2019-07-24

## 2019-07-24 NOTE — TELEPHONE ENCOUNTER
----- Message from Geetha Grimaldo DO sent at 7/24/2019 12:42 PM EDT -----  Please call the patient regarding her abnormal result  Pelvic ultrasound was ordered by Dr Fox Shape does showed uterine masses that do appear to be fibroids  If these are causing pain or she is experiencing any vaginal bleeding then they will need to be addressed by gynecology    Usually after menopause these are not commonly an issue

## 2019-07-24 NOTE — TELEPHONE ENCOUNTER
PATIENT DID SEE RESULTS; SHE IS WONDERING IF YOU CAN REFER HER TO GYNECOLOGY DUE TO THE DISCOMFORT SHE EXPERIENCES DUE TO THIS

## 2019-07-24 NOTE — TELEPHONE ENCOUNTER
????  She is actually established with [facility]ST[facility]ETA which is Dr Joe Hinton office  She was seen by their nurse practitioner 4 months ago for her routine gynecologic examination  They are pretty good  She does not really need referral   She has Medicare  I can put in a good word? ???

## 2019-07-24 NOTE — TELEPHONE ENCOUNTER
Maybe I worded this incorrectly anh  She is looking for your expert opinion on who is good for her to see for this issue

## 2019-07-25 DIAGNOSIS — R30.0 DYSURIA: Primary | ICD-10-CM

## 2019-07-25 DIAGNOSIS — F51.01 PRIMARY INSOMNIA: ICD-10-CM

## 2019-07-25 RX ORDER — ZOLPIDEM TARTRATE 10 MG/1
10 TABLET ORAL
Qty: 30 TABLET | Refills: 0 | Status: SHIPPED | OUTPATIENT
Start: 2019-07-25 | End: 2019-08-19 | Stop reason: SDUPTHER

## 2019-07-25 RX ORDER — OXYBUTYNIN CHLORIDE 5 MG/1
5 TABLET, EXTENDED RELEASE ORAL DAILY
Qty: 30 TABLET | Refills: 1 | Status: SHIPPED | OUTPATIENT
Start: 2019-07-25 | End: 2019-09-20 | Stop reason: SDUPTHER

## 2019-07-25 NOTE — TELEPHONE ENCOUNTER
I did provide a refill of Ditropan for the patient and sent to local pharmacy  Should be enough to get her through until appointment with gynecologist   Usually is indicated for treating overactive bladder

## 2019-07-25 NOTE — TELEPHONE ENCOUNTER
Patient can not get in until sept she has no issues waiting but she is going on vacation and does not want to have this discomfort  She states about a year ago that she saw you for this and before the tests were done it was believed she was having bladder spasms and you provided her with a med for this  Although that was not the actual reason for the med she states it did help lighten the discomfort; she is wondering if you would write for it again until she sees gyn which would be in September        [facility]

## 2019-08-19 DIAGNOSIS — F51.01 PRIMARY INSOMNIA: ICD-10-CM

## 2019-08-19 RX ORDER — ZOLPIDEM TARTRATE 10 MG/1
10 TABLET ORAL
Qty: 30 TABLET | Refills: 0 | Status: SHIPPED | OUTPATIENT
Start: 2019-08-19 | End: 2019-09-20 | Stop reason: SDUPTHER

## 2019-09-20 ENCOUNTER — OFFICE VISIT (OUTPATIENT)
Dept: OBGYN CLINIC | Facility: CLINIC | Age: 64
End: 2019-09-20
Payer: COMMERCIAL

## 2019-09-20 VITALS
HEIGHT: 67 IN | SYSTOLIC BLOOD PRESSURE: 144 MMHG | WEIGHT: 231.8 LBS | BODY MASS INDEX: 36.38 KG/M2 | DIASTOLIC BLOOD PRESSURE: 86 MMHG

## 2019-09-20 DIAGNOSIS — R30.0 DYSURIA: ICD-10-CM

## 2019-09-20 DIAGNOSIS — N32.81 OAB (OVERACTIVE BLADDER): ICD-10-CM

## 2019-09-20 DIAGNOSIS — N32.89 BLADDER SPASM: Primary | ICD-10-CM

## 2019-09-20 DIAGNOSIS — F51.01 PRIMARY INSOMNIA: ICD-10-CM

## 2019-09-20 DIAGNOSIS — D21.9 FIBROIDS: ICD-10-CM

## 2019-09-20 PROCEDURE — 99203 OFFICE O/P NEW LOW 30 MIN: CPT | Performed by: OBSTETRICS & GYNECOLOGY

## 2019-09-20 RX ORDER — ZOLPIDEM TARTRATE 10 MG/1
TABLET ORAL
Qty: 30 TABLET | Refills: 1 | Status: SHIPPED | OUTPATIENT
Start: 2019-09-20 | End: 2019-09-20 | Stop reason: SDUPTHER

## 2019-09-20 RX ORDER — METHENAMINE, SODIUM PHOSPHATE, MONOBASIC, MONOHYDRATE, PHENYL SALICYLATE, METHYLENE BLUE, AND HYOSCYAMINE SULFATE 120; 40.8; 36; 10; .12 MG/1; MG/1; MG/1; MG/1; MG/1
1 CAPSULE ORAL 2 TIMES DAILY PRN
Qty: 30 CAPSULE | Refills: 2 | Status: SHIPPED | OUTPATIENT
Start: 2019-09-20 | End: 2020-06-01 | Stop reason: ALTCHOICE

## 2019-09-20 RX ORDER — OXYBUTYNIN CHLORIDE 5 MG/1
5 TABLET, EXTENDED RELEASE ORAL DAILY
Qty: 30 TABLET | Refills: 1 | Status: SHIPPED | OUTPATIENT
Start: 2019-09-20 | End: 2019-09-20

## 2019-09-20 RX ORDER — ZOLPIDEM TARTRATE 10 MG/1
10 TABLET ORAL
Qty: 30 TABLET | Refills: 0 | Status: SHIPPED | OUTPATIENT
Start: 2019-09-20 | End: 2019-12-05 | Stop reason: SDUPTHER

## 2019-09-20 RX ORDER — OXYBUTYNIN CHLORIDE 5 MG/1
5 TABLET, EXTENDED RELEASE ORAL DAILY
Qty: 90 TABLET | Refills: 3 | Status: SHIPPED | OUTPATIENT
Start: 2019-09-20 | End: 2020-05-11

## 2019-09-20 NOTE — PROGRESS NOTES
Saadia Bentley was seen today for establish care and fibroids  Diagnoses and all orders for this visit:    Bladder spasm  -     Meth-Hyo-M Bl-Na Phos-Ph Sal (URIBEL) 118 MG CAPS; Take 1 capsule (118 mg total) by mouth 2 (two) times a day as needed (bladder spasm)    Dysuria  -     Discontinue: oxybutynin (DITROPAN-XL) 5 mg 24 hr tablet; Take 1 tablet (5 mg total) by mouth daily  -     oxybutynin (DITROPAN-XL) 5 mg 24 hr tablet; Take 1 tablet (5 mg total) by mouth daily    Fibroids    OAB (overactive bladder)         Plan  Continue Ditropan  Rx sent for Uribel  If not covered, try AZO OTC  Fibroids are small and I do not feel they are contributing to her current symptomatology  Ultrasound showed significant bowel gas artifact which could contribute to her current symptoms  Recommend simethicone p r n     Consider GI consult  Patient will follow up in March for her annual exam, sooner as needed  Subjective     Keyon Braden is a 60 y o  female here for a problem visit  Patient is complaining of pelvic pressure and frequency of urination  She was seen by her PCP and was ruled out for UTI  A pelvic ultrasound was done which showed very small fibroids within the walls of the uterus  Overall the size of the uterus is as expected for a postmenopausal female  The fibroids are 1 4 cm, 1 1 cm and 0 9 cm  These have likely been there for some time and are small and therefore unlikely to be contributing to her pelvic pressure symptoms  They did note bowel gas artifact which obscured visualization of the ovaries  We discussed that bowel gas may also contribute to the pressure symptoms that she is experiencing  We discussed simethicone p r n  Radha Comer She did have a colonoscopy about 4 years ago and per Dr Vicky Carreon she won;t be due for another 5 years  We discussed overactive bladder and bladder spasms  She is taking Ditropan her PCP recommendation and she felt like this helped a little bit    She had an episode like this about a year ago and was counseled on avoiding caffeinated beverages  She noted the symptoms improved at this time  She is unsure if the current symptoms have been exacerbated by something she ate  We reviewed foods and beverages to avoid in the setting of bladder spasms such as caffeine, citric juices, acidic foods, chocolate and alcoholic beverages  I recommend she continue the Ditropan XL and sent in a prescription for Uribel  If the [facility]or is not covered, she may try over-the-counter AZO for bladder  Patient Active Problem List   Diagnosis    Raynaud's disease    Well female exam with routine gynecological exam    Annual physical exam    Vitamin D deficiency    Family history of brain cancer    Family history of aneurysm    Flu vaccine need    Encounter for annual routine gynecological examination    Encounter for screening mammogram for malignant neoplasm of breast    Primary insomnia    Need for hepatitis C screening test    Dysuria    Pelvic fullness in female         Gynecologic History  No LMP recorded  Patient is postmenopausal   Contraception: post menopausal status  Last Pap: 3/2019  Results were: normal; HPV negative    Menstrual History:  OB History        0    Para   0    Term   0       0    AB   0    Living   0       SAB   0    TAB   0    Ectopic   0    Multiple   0    Live Births   0                  No LMP recorded   Patient is postmenopausal          Obstetric History  OB History    Para Term  AB Living   0 0 0 0 0 0   SAB TAB Ectopic Multiple Live Births   0 0 0 0 0         Past Medical History:   Diagnosis Date    Ovarian cyst     Right    Raynaud phenomenon        Past Surgical History:   Procedure Laterality Date    REPLACEMENT TOTAL KNEE Bilateral              Family History   Problem Relation Age of Onset    Other Brother         Brain tumor     Diabetes Mother     Osteoarthritis Mother     Diabetes Father    Rika Cecys Aneurysm Father        Social History     Socioeconomic History    Marital status: /Civil Union     Spouse name: Not on file    Number of children: Not on file    Years of education: Not on file    Highest education level: Not on file   Occupational History    Not on file   Social Needs    Financial resource strain: Not on file    Food insecurity:     Worry: Not on file     Inability: Not on file    Transportation needs:     Medical: Not on file     Non-medical: Not on file   Tobacco Use    Smoking status: Never Smoker    Smokeless tobacco: Never Used   Substance and Sexual Activity    Alcohol use: Yes     Comment: Occasional glass of wine or drink on weekends    Drug use: No    Sexual activity: Not Currently     Partners: Male     Birth control/protection: None   Lifestyle    Physical activity:     Days per week: Not on file     Minutes per session: Not on file    Stress: Not on file   Relationships    Social connections:     Talks on phone: Not on file     Gets together: Not on file     Attends Mandaeism service: Not on file     Active member of club or organization: Not on file     Attends meetings of clubs or organizations: Not on file     Relationship status: Not on file    Intimate partner violence:     Fear of current or ex partner: Not on file     Emotionally abused: Not on file     Physically abused: Not on file     Forced sexual activity: Not on file   Other Topics Concern    Not on file   Social History Narrative     , lives with        Allergies  Patient has no known allergies      Medications    Current Outpatient Medications:     diltiazem (CARDIZEM) 60 mg tablet, Take 1 tablet (60 mg total) by mouth 3 (three) times a day replacing extended release formulation, Disp: 270 tablet, Rfl: 1    oxybutynin (DITROPAN-XL) 5 mg 24 hr tablet, Take 1 tablet (5 mg total) by mouth daily, Disp: 90 tablet, Rfl: 3    zolpidem (AMBIEN) 10 mg tablet, TAKE ONE TABLET BY MOUTH AT BEDTIME AS NEEDED FOR SLEEP, Disp: 30 tablet, Rfl: 1    Meth-Hyo-M Bl-Na Phos-Ph Sal (URIBEL) 118 MG CAPS, Take 1 capsule (118 mg total) by mouth 2 (two) times a day as needed (bladder spasm), Disp: 30 capsule, Rfl: 2      Review of Systems  Review of Systems   Constitutional: Negative for activity change, appetite change, chills, fatigue, fever and unexpected weight change  HENT: Negative for hearing loss, mouth sores and nosebleeds  Eyes: Negative for visual disturbance  Respiratory: Negative for chest tightness, shortness of breath and wheezing  Cardiovascular: Negative for chest pain, palpitations and leg swelling  Gastrointestinal: Positive for abdominal distention and abdominal pain (Pressure)  Negative for blood in stool, constipation, diarrhea, nausea and vomiting  Endocrine: Negative for cold intolerance and heat intolerance  Genitourinary: Positive for frequency and urgency  Negative for difficulty urinating, dyspareunia, dysuria, flank pain, genital sores, hematuria, menstrual problem, pelvic pain, vaginal bleeding, vaginal discharge and vaginal pain  Leakage of urine     Musculoskeletal: Negative for back pain, myalgias and neck pain  Allergic/Immunologic: Negative for immunocompromised state  Neurological: Negative for dizziness, seizures, syncope, weakness, light-headedness and headaches  Hematological: Negative for adenopathy  Does not bruise/bleed easily  Psychiatric/Behavioral: Positive for sleep disturbance  Negative for agitation, behavioral problems, confusion, self-injury and suicidal ideas  The patient is not nervous/anxious  Objective     /86 (BP Location: Left arm, Patient Position: Sitting, Cuff Size: Large)   Ht 5' 6 5" (1 689 m)   Wt 105 kg (231 lb 12 8 oz)   BMI 36 85 kg/m²       Physical Exam   Constitutional: She is oriented to person, place, and time  She appears well-developed and well-nourished     Pulmonary/Chest: Effort normal  No respiratory distress  Neurological: She is alert and oriented to person, place, and time  Psychiatric: She has a normal mood and affect  Her behavior is normal    Vitals reviewed

## 2019-11-11 DIAGNOSIS — F51.01 PRIMARY INSOMNIA: ICD-10-CM

## 2019-11-11 RX ORDER — ZOLPIDEM TARTRATE 10 MG/1
10 TABLET ORAL
Qty: 30 TABLET | Refills: 0 | Status: CANCELLED | OUTPATIENT
Start: 2019-11-11

## 2019-12-05 DIAGNOSIS — F51.01 PRIMARY INSOMNIA: ICD-10-CM

## 2019-12-05 RX ORDER — ZOLPIDEM TARTRATE 10 MG/1
10 TABLET ORAL
Qty: 30 TABLET | Refills: 1 | Status: SHIPPED | OUTPATIENT
Start: 2019-12-05 | End: 2020-01-30 | Stop reason: SDUPTHER

## 2020-01-30 DIAGNOSIS — F51.01 PRIMARY INSOMNIA: ICD-10-CM

## 2020-01-30 RX ORDER — ZOLPIDEM TARTRATE 10 MG/1
10 TABLET ORAL
Qty: 30 TABLET | Refills: 3 | Status: SHIPPED | OUTPATIENT
Start: 2020-01-30 | End: 2020-05-12 | Stop reason: SDUPTHER

## 2020-04-20 LAB
25(OH)D3 SERPL-MCNC: 58 NG/ML (ref 30–100)
ALBUMIN SERPL-MCNC: 4.3 G/DL (ref 3.6–5.1)
ALBUMIN/GLOB SERPL: 1.6 (CALC) (ref 1–2.5)
ALP SERPL-CCNC: 75 U/L (ref 37–153)
ALT SERPL-CCNC: 20 U/L (ref 6–29)
AST SERPL-CCNC: 19 U/L (ref 10–35)
BASOPHILS # BLD AUTO: 62 CELLS/UL (ref 0–200)
BASOPHILS NFR BLD AUTO: 1.2 %
BILIRUB SERPL-MCNC: 1.3 MG/DL (ref 0.2–1.2)
BUN SERPL-MCNC: 24 MG/DL (ref 7–25)
BUN/CREAT SERPL: ABNORMAL (CALC) (ref 6–22)
CALCIUM SERPL-MCNC: 10 MG/DL (ref 8.6–10.4)
CHLORIDE SERPL-SCNC: 100 MMOL/L (ref 98–110)
CHOLEST SERPL-MCNC: 226 MG/DL
CHOLEST/HDLC SERPL: 3 (CALC)
CO2 SERPL-SCNC: 28 MMOL/L (ref 20–32)
CREAT SERPL-MCNC: 0.86 MG/DL (ref 0.5–0.99)
EOSINOPHIL # BLD AUTO: 99 CELLS/UL (ref 15–500)
EOSINOPHIL NFR BLD AUTO: 1.9 %
ERYTHROCYTE [DISTWIDTH] IN BLOOD BY AUTOMATED COUNT: 13.1 % (ref 11–15)
GLOBULIN SER CALC-MCNC: 2.7 G/DL (CALC) (ref 1.9–3.7)
GLUCOSE SERPL-MCNC: 89 MG/DL (ref 65–99)
HCT VFR BLD AUTO: 43.7 % (ref 35–45)
HCV AB S/CO SERPL IA: 0.01
HCV AB SERPL QL IA: NORMAL
HDLC SERPL-MCNC: 76 MG/DL
HGB BLD-MCNC: 14.4 G/DL (ref 11.7–15.5)
LDLC SERPL CALC-MCNC: 136 MG/DL (CALC)
LYMPHOCYTES # BLD AUTO: 1856 CELLS/UL (ref 850–3900)
LYMPHOCYTES NFR BLD AUTO: 35.7 %
MCH RBC QN AUTO: 30.1 PG (ref 27–33)
MCHC RBC AUTO-ENTMCNC: 33 G/DL (ref 32–36)
MCV RBC AUTO: 91.4 FL (ref 80–100)
MONOCYTES # BLD AUTO: 546 CELLS/UL (ref 200–950)
MONOCYTES NFR BLD AUTO: 10.5 %
NEUTROPHILS # BLD AUTO: 2636 CELLS/UL (ref 1500–7800)
NEUTROPHILS NFR BLD AUTO: 50.7 %
NONHDLC SERPL-MCNC: 150 MG/DL (CALC)
PLATELET # BLD AUTO: 181 THOUSAND/UL (ref 140–400)
PMV BLD REES-ECKER: 12.7 FL (ref 7.5–12.5)
POTASSIUM SERPL-SCNC: 4.4 MMOL/L (ref 3.5–5.3)
PROT SERPL-MCNC: 7 G/DL (ref 6.1–8.1)
RBC # BLD AUTO: 4.78 MILLION/UL (ref 3.8–5.1)
SL AMB EGFR AFRICAN AMERICAN: 84 ML/MIN/1.73M2
SL AMB EGFR NON AFRICAN AMERICAN: 72 ML/MIN/1.73M2
SODIUM SERPL-SCNC: 138 MMOL/L (ref 135–146)
TRIGL SERPL-MCNC: 58 MG/DL
TSH SERPL-ACNC: 2.07 MIU/L (ref 0.4–4.5)
WBC # BLD AUTO: 5.2 THOUSAND/UL (ref 3.8–10.8)

## 2020-04-21 ENCOUNTER — TELEPHONE (OUTPATIENT)
Dept: OBGYN CLINIC | Facility: CLINIC | Age: 65
End: 2020-04-21

## 2020-04-21 DIAGNOSIS — Z12.31 ENCOUNTER FOR SCREENING MAMMOGRAM FOR MALIGNANT NEOPLASM OF BREAST: Primary | ICD-10-CM

## 2020-05-11 ENCOUNTER — TELEPHONE (OUTPATIENT)
Dept: FAMILY MEDICINE CLINIC | Facility: CLINIC | Age: 65
End: 2020-05-11

## 2020-05-11 ENCOUNTER — OFFICE VISIT (OUTPATIENT)
Dept: FAMILY MEDICINE CLINIC | Facility: CLINIC | Age: 65
End: 2020-05-11
Payer: COMMERCIAL

## 2020-05-11 VITALS
RESPIRATION RATE: 16 BRPM | HEIGHT: 66 IN | WEIGHT: 231 LBS | TEMPERATURE: 97.3 F | BODY MASS INDEX: 37.12 KG/M2 | SYSTOLIC BLOOD PRESSURE: 134 MMHG | OXYGEN SATURATION: 98 % | DIASTOLIC BLOOD PRESSURE: 82 MMHG | HEART RATE: 74 BPM

## 2020-05-11 DIAGNOSIS — R04.0 RECURRENT EPISTAXIS: Primary | ICD-10-CM

## 2020-05-11 PROCEDURE — 99213 OFFICE O/P EST LOW 20 MIN: CPT | Performed by: FAMILY MEDICINE

## 2020-05-11 PROCEDURE — 3008F BODY MASS INDEX DOCD: CPT | Performed by: FAMILY MEDICINE

## 2020-05-11 PROCEDURE — 1036F TOBACCO NON-USER: CPT | Performed by: FAMILY MEDICINE

## 2020-05-12 DIAGNOSIS — F51.01 PRIMARY INSOMNIA: ICD-10-CM

## 2020-05-12 RX ORDER — ZOLPIDEM TARTRATE 10 MG/1
10 TABLET ORAL
Qty: 30 TABLET | Refills: 0 | Status: CANCELLED | OUTPATIENT
Start: 2020-05-12

## 2020-05-12 RX ORDER — ZOLPIDEM TARTRATE 10 MG/1
10 TABLET ORAL
Qty: 30 TABLET | Refills: 3 | Status: SHIPPED | OUTPATIENT
Start: 2020-05-12 | End: 2020-08-31

## 2020-05-27 ENCOUNTER — HOSPITAL ENCOUNTER (OUTPATIENT)
Dept: MAMMOGRAPHY | Facility: HOSPITAL | Age: 65
Discharge: HOME | End: 2020-05-27
Attending: OBSTETRICS & GYNECOLOGY
Payer: COMMERCIAL

## 2020-05-27 VITALS — BODY MASS INDEX: 37.12 KG/M2 | HEIGHT: 66 IN | WEIGHT: 231 LBS

## 2020-05-27 DIAGNOSIS — Z12.31 ENCOUNTER FOR SCREENING MAMMOGRAM FOR MALIGNANT NEOPLASM OF BREAST: ICD-10-CM

## 2020-05-27 PROCEDURE — 77067 SCR MAMMO BI INCL CAD: CPT

## 2020-05-27 PROCEDURE — 77063 BREAST TOMOSYNTHESIS BI: CPT

## 2020-06-01 ENCOUNTER — OFFICE VISIT (OUTPATIENT)
Dept: FAMILY MEDICINE CLINIC | Facility: CLINIC | Age: 65
End: 2020-06-01
Payer: COMMERCIAL

## 2020-06-01 ENCOUNTER — TELEPHONE (OUTPATIENT)
Dept: FAMILY MEDICINE CLINIC | Facility: CLINIC | Age: 65
End: 2020-06-01

## 2020-06-01 VITALS
HEIGHT: 66 IN | OXYGEN SATURATION: 96 % | RESPIRATION RATE: 16 BRPM | WEIGHT: 231 LBS | BODY MASS INDEX: 37.12 KG/M2 | HEART RATE: 64 BPM | DIASTOLIC BLOOD PRESSURE: 72 MMHG | SYSTOLIC BLOOD PRESSURE: 118 MMHG

## 2020-06-01 DIAGNOSIS — J30.2 SEASONAL ALLERGIES: ICD-10-CM

## 2020-06-01 DIAGNOSIS — Z00.00 ANNUAL PHYSICAL EXAM: Primary | ICD-10-CM

## 2020-06-01 DIAGNOSIS — I73.00 RAYNAUD'S DISEASE WITHOUT GANGRENE: ICD-10-CM

## 2020-06-01 DIAGNOSIS — F51.01 PRIMARY INSOMNIA: ICD-10-CM

## 2020-06-01 DIAGNOSIS — R04.0 RECURRENT EPISTAXIS: ICD-10-CM

## 2020-06-01 DIAGNOSIS — Z23 NEED FOR PROPHYLACTIC VACCINATION AGAINST DIPHTHERIA AND TETANUS: ICD-10-CM

## 2020-06-01 DIAGNOSIS — E55.9 VITAMIN D DEFICIENCY: ICD-10-CM

## 2020-06-01 DIAGNOSIS — Z13.820 SCREENING FOR OSTEOPOROSIS: ICD-10-CM

## 2020-06-01 PROBLEM — R30.0 DYSURIA: Status: RESOLVED | Noted: 2019-07-16 | Resolved: 2020-06-01

## 2020-06-01 PROCEDURE — 99396 PREV VISIT EST AGE 40-64: CPT | Performed by: FAMILY MEDICINE

## 2020-06-01 PROCEDURE — 99213 OFFICE O/P EST LOW 20 MIN: CPT | Performed by: FAMILY MEDICINE

## 2020-06-01 PROCEDURE — 1036F TOBACCO NON-USER: CPT | Performed by: FAMILY MEDICINE

## 2020-06-01 PROCEDURE — 3008F BODY MASS INDEX DOCD: CPT | Performed by: FAMILY MEDICINE

## 2020-06-01 PROCEDURE — 90715 TDAP VACCINE 7 YRS/> IM: CPT | Performed by: FAMILY MEDICINE

## 2020-06-01 PROCEDURE — 90471 IMMUNIZATION ADMIN: CPT | Performed by: FAMILY MEDICINE

## 2020-06-01 RX ORDER — OLOPATADINE HYDROCHLORIDE 1 MG/ML
1 SOLUTION/ DROPS OPHTHALMIC 2 TIMES DAILY
Qty: 5 ML | Refills: 3 | Status: SHIPPED | OUTPATIENT
Start: 2020-06-01 | End: 2021-06-18

## 2020-06-01 RX ORDER — MONTELUKAST SODIUM 10 MG/1
10 TABLET ORAL
Qty: 30 TABLET | Refills: 5 | Status: SHIPPED | OUTPATIENT
Start: 2020-06-01 | End: 2020-06-30 | Stop reason: SDUPTHER

## 2020-06-09 ENCOUNTER — HOSPITAL ENCOUNTER (OUTPATIENT)
Dept: RADIOLOGY | Age: 65
Discharge: HOME | End: 2020-06-09
Payer: COMMERCIAL

## 2020-06-09 DIAGNOSIS — Z13.820 SCREENING FOR OSTEOPOROSIS: ICD-10-CM

## 2020-06-09 PROCEDURE — 77080 DXA BONE DENSITY AXIAL: CPT

## 2020-06-26 ENCOUNTER — OFFICE VISIT (OUTPATIENT)
Dept: FAMILY MEDICINE CLINIC | Facility: CLINIC | Age: 65
End: 2020-06-26
Payer: COMMERCIAL

## 2020-06-26 VITALS
BODY MASS INDEX: 36.96 KG/M2 | TEMPERATURE: 96.6 F | HEIGHT: 66 IN | HEART RATE: 60 BPM | SYSTOLIC BLOOD PRESSURE: 138 MMHG | RESPIRATION RATE: 16 BRPM | OXYGEN SATURATION: 94 % | WEIGHT: 230 LBS | DIASTOLIC BLOOD PRESSURE: 68 MMHG

## 2020-06-26 DIAGNOSIS — R04.0 RECURRENT EPISTAXIS: Primary | ICD-10-CM

## 2020-06-26 PROCEDURE — 1036F TOBACCO NON-USER: CPT | Performed by: FAMILY MEDICINE

## 2020-06-26 PROCEDURE — 3008F BODY MASS INDEX DOCD: CPT | Performed by: FAMILY MEDICINE

## 2020-06-26 PROCEDURE — 99213 OFFICE O/P EST LOW 20 MIN: CPT | Performed by: FAMILY MEDICINE

## 2020-06-26 PROCEDURE — 30901 CONTROL OF NOSEBLEED: CPT | Performed by: FAMILY MEDICINE

## 2020-06-30 DIAGNOSIS — J30.2 SEASONAL ALLERGIES: ICD-10-CM

## 2020-06-30 RX ORDER — MONTELUKAST SODIUM 10 MG/1
10 TABLET ORAL
Qty: 90 TABLET | Refills: 1 | Status: SHIPPED | OUTPATIENT
Start: 2020-06-30 | End: 2020-09-27 | Stop reason: SDUPTHER

## 2020-07-01 ENCOUNTER — ANNUAL EXAM (OUTPATIENT)
Dept: OBGYN CLINIC | Facility: CLINIC | Age: 65
End: 2020-07-01
Payer: COMMERCIAL

## 2020-07-01 VITALS — SYSTOLIC BLOOD PRESSURE: 116 MMHG | DIASTOLIC BLOOD PRESSURE: 76 MMHG | WEIGHT: 189.2 LBS | BODY MASS INDEX: 30.54 KG/M2

## 2020-07-01 DIAGNOSIS — Z01.419 WOMEN'S ANNUAL ROUTINE GYNECOLOGICAL EXAMINATION: Primary | ICD-10-CM

## 2020-07-01 PROCEDURE — 99396 PREV VISIT EST AGE 40-64: CPT | Performed by: OBSTETRICS & GYNECOLOGY

## 2020-07-01 RX ORDER — DIPHENOXYLATE HYDROCHLORIDE AND ATROPINE SULFATE 2.5; .025 MG/1; MG/1
1 TABLET ORAL DAILY
COMMUNITY

## 2020-07-01 NOTE — PROGRESS NOTES
ASSESSMENT & PLAN:     Diagnoses and all orders for this visit:    Women's annual routine gynecological examination    Other orders  -     multivitamin (THERAGRAN) TABS; Take 1 tablet by mouth daily      The following were reviewed in today's visit: Current ASCCP Guidelines, breast self exam, mammography screening ordered, menopause, adequate intake of calcium and vitamin D, exercise and healthy diet  Patient to return to office yearly for annual exam      All questions have been answered to her satisfaction  CC:  Annual Gynecologic Examination    HPI: Bing Brock is a 63 y o  [place] who presents for annual gynecologic examination  She has the following concerns:  None  She lost 40 lbs with diet and exercise  Health Maintenance:    She exercises 7 days per week, walks daily  She wears her seatbelt routinely  She does perform regular monthly self breast exams  Patient tries to follow a balanced diet  Last mammogram: 5/2020  Last colonoscopy: due for 5 years  Past Medical History:   Diagnosis Date    Ovarian cyst     Right    Raynaud phenomenon        Past Surgical History:   Procedure Laterality Date    CAUTERIZE INNER NOSE      REPLACEMENT TOTAL KNEE Bilateral     2012/2013       Past OB/Gyn History:   No LMP recorded  Patient is postmenopausal       Patient is post menopausal    History of sexually transmitted infection: No  Patient is not currently sexually active  Birth control: postmenopausal  Last Pap 2019, NILM, neg hpv         Family History   Problem Relation Age of Onset    Other Brother         Brain tumor     Diabetes Mother     Osteoarthritis Mother     Diabetes Father     Aneurysm Father     No Known Problems Brother        Social History:  Social History     Socioeconomic History    Marital status: /Civil Union     Spouse name: Not on file    Number of children: Not on file    Years of education: Not on file    Highest education level: Not on file   Occupational History    Not on file   Social Needs    Financial resource strain: Not on file    Food insecurity:     Worry: Not on file     Inability: Not on file    Transportation needs:     Medical: Not on file     Non-medical: Not on file   Tobacco Use    Smoking status: Never Smoker    Smokeless tobacco: Never Used   Substance and Sexual Activity    Alcohol use: Yes     Frequency: Monthly or less     Comment: Occasional glass of wine or drink on weekends    Drug use: No    Sexual activity: Not Currently     Partners: Male     Birth control/protection: None   Lifestyle    Physical activity:     Days per week: Not on file     Minutes per session: Not on file    Stress: Not on file   Relationships    Social connections:     Talks on phone: Not on file     Gets together: Not on file     Attends Episcopal service: Not on file     Active member of club or organization: Not on file     Attends meetings of clubs or organizations: Not on file     Relationship status: Not on file    Intimate partner violence:     Fear of current or ex partner: Not on file     Emotionally abused: Not on file     Physically abused: Not on file     Forced sexual activity: Not on file   Other Topics Concern    Not on file   Social History Narrative     , lives with      Presently lives with   She feels safe at home       No Known Allergies      Current Outpatient Medications:     diltiazem (CARDIZEM) 60 mg tablet, Take 1 tablet (60 mg total) by mouth 3 (three) times a day replacing extended release formulation, Disp: 270 tablet, Rfl: 1    montelukast (SINGULAIR) 10 mg tablet, Take 1 tablet (10 mg total) by mouth daily at bedtime, Disp: 90 tablet, Rfl: 1    multivitamin (THERAGRAN) TABS, Take 1 tablet by mouth daily, Disp: , Rfl:     olopatadine (PATANOL) 0 1 % ophthalmic solution, Administer 1 drop to both eyes 2 (two) times a day, Disp: 5 mL, Rfl: 3    zolpidem (AMBIEN) 10 mg tablet, Take 1 tablet (10 mg total) by mouth daily at bedtime as needed for sleep, Disp: 30 tablet, Rfl: 3    Review of Systems:  Review of Systems   Constitutional: Negative for chills, fever and unexpected weight change  Respiratory: Negative for cough and shortness of breath  Cardiovascular: Negative for chest pain and palpitations  Gastrointestinal: Negative for abdominal distention, abdominal pain, blood in stool, constipation, nausea and vomiting  Genitourinary: Negative for difficulty urinating, dyspareunia, dysuria, flank pain, genital sores, hematuria, menstrual problem, pelvic pain, urgency, vaginal bleeding, vaginal discharge and vaginal pain  Neurological: Negative for headaches  Physical Exam:  /76   Wt 85 8 kg (189 lb 3 2 oz)   BMI 30 54 kg/m²    Physical Exam   Constitutional: She is oriented to person, place, and time  She appears well-developed and well-nourished  Genitourinary: Vagina normal and uterus normal  Pelvic exam was performed with patient supine  There is no rash, tenderness or lesion on the right labia  There is no rash, tenderness or lesion on the left labia  Vagina exhibits rugosity (moderate atrophy)  No erythema, tenderness or bleeding in the vagina  No vaginal discharge found  Right adnexum does not display mass, does not display tenderness and does not display fullness  Left adnexum does not display mass, does not display tenderness and does not display fullness  Cervix exhibits pinkness  Cervix does not exhibit motion tenderness, lesion or polyp  Uterus is not enlarged, tender, exhibiting a mass, irregular (is regular) or mobile  Genitourinary Comments: No bladder tenderness  Urethral meatus midline, no masses  HENT:   Head: Normocephalic and atraumatic  Cardiovascular: Normal rate, regular rhythm and normal heart sounds  Pulmonary/Chest: Effort normal and breath sounds normal  No respiratory distress   Right breast exhibits no inverted nipple, no mass, no nipple discharge, no skin change and no tenderness  Left breast exhibits no inverted nipple, no mass, no nipple discharge, no skin change and no tenderness  Abdominal: Soft  She exhibits no distension  There is no tenderness  There is no guarding  Neurological: She is alert and oriented to person, place, and time  Psychiatric: She has a normal mood and affect  Her behavior is normal    Vitals reviewed

## 2020-08-06 ENCOUNTER — OFFICE VISIT (OUTPATIENT)
Dept: FAMILY MEDICINE CLINIC | Facility: CLINIC | Age: 65
End: 2020-08-06
Payer: COMMERCIAL

## 2020-08-06 VITALS
HEART RATE: 60 BPM | HEIGHT: 66 IN | SYSTOLIC BLOOD PRESSURE: 124 MMHG | TEMPERATURE: 97.5 F | BODY MASS INDEX: 30.05 KG/M2 | DIASTOLIC BLOOD PRESSURE: 80 MMHG | WEIGHT: 187 LBS | OXYGEN SATURATION: 98 %

## 2020-08-06 DIAGNOSIS — H25.013 CORTICAL AGE-RELATED CATARACT OF BOTH EYES: Primary | ICD-10-CM

## 2020-08-06 PROCEDURE — 3008F BODY MASS INDEX DOCD: CPT | Performed by: FAMILY MEDICINE

## 2020-08-06 PROCEDURE — 99214 OFFICE O/P EST MOD 30 MIN: CPT | Performed by: FAMILY MEDICINE

## 2020-08-06 PROCEDURE — 1036F TOBACCO NON-USER: CPT | Performed by: FAMILY MEDICINE

## 2020-08-06 RX ORDER — NEOMYCIN SULFATE, POLYMYXIN B SULFATE AND DEXAMETHASONE 3.5; 10000; 1 MG/ML; [USP'U]/ML; MG/ML
SUSPENSION/ DROPS OPHTHALMIC
COMMUNITY
Start: 2020-07-24

## 2020-08-06 NOTE — ASSESSMENT & PLAN NOTE
- patient is medically cleared for cataract extraction under MAC anesthesia    No further workup is necessary

## 2020-08-06 NOTE — PROGRESS NOTES
PRE-OPERATIVE EVALUATION  [facility]    NAME: Rory Bryant  AGE: 63 y o  SEX: female  : 1955     DATE: 2020     Pre-Operative Evaluation      Chief Complaint: Pre-operative Evaluation     Surgery:  Bilateral cataract surgery  Anticipated Date of Surgery:  Right eye - 20  & left eye - 20  Referring Provider: DENY Ontiveros  History of Present Illness:     Rory Bryant is a 63 y o  female who presents to the office today for a preoperative consultation at the request of surgeon, Dr Tamanna Kong, who plans on performing cataract surgery on 2020 and 2020  Planned anesthesia is IV sedation  Patient has a bleeding risk of: no recent abnormal bleeding  Patient does not have objections to receiving blood products if needed  Current anti-platelet/anti-coagulation medications that the patient is prescribed includes: None  Assessment of Chronic Conditions:   - None     Assessment of Cardiac Risk:  · Denies unstable or severe angina or MI in the last 6 weeks or history of stent placement in the last year   · Denies decompensated heart failure (e g  New onset heart failure, NYHA functional class IV heart failure, or worsening existing heart failure)  · Denies significant arrhythmias such as high grade AV block, symptomatic ventricular arrhythmia, newly recognized ventricular tachycardia, supraventricular tachycardia with resting heart rate >100, or symptomatic bradycardia  · Denies severe heart valve disease including aortic stenosis or symptomatic mitral stenosis     Exercise Capacity:  · Able to walk 4 blocks without symptoms?: Yes  · Able to walk 2 flights without symptoms?: Yes    Prior Anesthesia Reactions: No     Personal history of venous thromboembolic disease? No    History of steroid use for >2 weeks within last year?  No    STOP-BANG Sleep Apnea Screening Questionnaire:         Review of Systems:     Review of Systems   Constitutional: Negative  HENT: Negative  Eyes: Positive for visual disturbance (Diminished vision due to cataracts)  Respiratory: Negative  Cardiovascular: Negative  Gastrointestinal: Negative  Endocrine: Negative  Genitourinary: Negative  Musculoskeletal: Negative  Skin: Negative  Allergic/Immunologic: Negative  Neurological: Negative  Hematological: Negative  Psychiatric/Behavioral: Negative          Current Problem List:     Patient Active Problem List   Diagnosis    Raynaud's disease    Well female exam with routine gynecological exam    Annual physical exam    Vitamin D deficiency    Family history of brain cancer    Family history of aneurysm    Flu vaccine need    Encounter for annual routine gynecological examination    Encounter for screening mammogram for malignant neoplasm of breast    Primary insomnia    Need for hepatitis C screening test    Pelvic fullness in female    Recurrent epistaxis    Screening for osteoporosis    Need for prophylactic vaccination against diphtheria and tetanus    Seasonal allergies    Bladder dysfunction       Allergies:     No Known Allergies    Current Medications:       Current Outpatient Medications:     diltiazem (CARDIZEM) 60 mg tablet, Take 1 tablet (60 mg total) by mouth 3 (three) times a day replacing extended release formulation, Disp: 270 tablet, Rfl: 1    montelukast (SINGULAIR) 10 mg tablet, Take 1 tablet (10 mg total) by mouth daily at bedtime, Disp: 90 tablet, Rfl: 1    multivitamin (THERAGRAN) TABS, Take 1 tablet by mouth daily, Disp: , Rfl:     zolpidem (AMBIEN) 10 mg tablet, Take 1 tablet (10 mg total) by mouth daily at bedtime as needed for sleep, Disp: 30 tablet, Rfl: 3    neomycin-polymyxin-dexamethasone (MAXITROL) ophthalmic suspension, , Disp: , Rfl:     olopatadine (PATANOL) 0 1 % ophthalmic solution, Administer 1 drop to both eyes 2 (two) times a day (Patient not taking: Reported on 8/6/2020), Disp: 5 mL, Rfl: 3    Past Medical History:       Past Medical History:   Diagnosis Date    Ovarian cyst     Right    Raynaud phenomenon         Past Surgical History:   Procedure Laterality Date    CAUTERIZE INNER NOSE      REPLACEMENT TOTAL KNEE Bilateral     2012/2013        Family History   Problem Relation Age of Onset    Other Brother         Brain tumor     Diabetes Mother     Osteoarthritis Mother     Diabetes Father     Aneurysm Father     No Known Problems Brother         Social History     Socioeconomic History    Marital status: /Civil Union     Spouse name: Not on file    Number of children: Not on file    Years of education: Not on file    Highest education level: Not on file   Occupational History    Not on file   Social Needs    Financial resource strain: Not on file    Food insecurity     Worry: Not on file     Inability: Not on file    Transportation needs     Medical: Not on file     Non-medical: Not on file   Tobacco Use    Smoking status: Never Smoker    Smokeless tobacco: Never Used   Substance and Sexual Activity    Alcohol use: Yes     Frequency: Monthly or less     Comment: Occasional glass of wine or drink on weekends    Drug use: No    Sexual activity: Not Currently     Partners: Male     Birth control/protection: None   Lifestyle    Physical activity     Days per week: Not on file     Minutes per session: Not on file    Stress: Not on file   Relationships    Social connections     Talks on phone: Not on file     Gets together: Not on file     Attends Yarsanism service: Not on file     Active member of club or organization: Not on file     Attends meetings of clubs or organizations: Not on file     Relationship status: Not on file    Intimate partner violence     Fear of current or ex partner: Not on file     Emotionally abused: Not on file     Physically abused: Not on file     Forced sexual activity: Not on file   Other Topics Concern    Not on file   Social History Narrative     , lives with         Physical Exam:     Physical Exam   Constitutional: She is oriented to person, place, and time  She appears well-developed  No distress  HENT:   Head: Normocephalic and atraumatic  Right Ear: External ear normal    Left Ear: External ear normal    Nose: Nose normal    Eyes: Pupils are equal, round, and reactive to light  Conjunctivae are normal    Neck: Normal range of motion  Neck supple  Cardiovascular: Normal rate, regular rhythm and normal heart sounds  No murmur heard  Pulmonary/Chest: Effort normal and breath sounds normal    Abdominal: Soft  Normal appearance and bowel sounds are normal    Musculoskeletal: Normal range of motion  Neurological: She is alert and oriented to person, place, and time  She has normal reflexes  Skin: Skin is warm and dry  No rash noted  Psychiatric: Her behavior is normal  Judgment and thought content normal    Nursing note and vitals reviewed  Data:     Pre-operative work-up    · None required      Assessment & Recommendations:     No diagnosis found  Pre-Op Evaluation Assessment  63 y o  female with planned surgery:  Bilateral cataract surgery with intra-ocular lens placement  Known risk factors for perioperative complications: None  Cardiac Risk Estimation: per the Revised Cardiac Risk Index (Circ  [id number], 1999), the patient's risk factors for cardiac complications include None, putting her in: RCI RISK CLASS I (0 risk factors, risk of major cardiac compl  appr  0 5%)  Current medications which may produce withdrawal symptoms if withheld perioperatively:  None  Pre-Op Evaluation Plan  1  Further preoperative workup as follows:   - None; no further preoperative work-up is required    2  Medication Management/Recommendations:   - None, continue medication regimen including morning of surgery, with sip of water    3   Prophylaxis for cardiac events with perioperative beta-blockers: not indicated  4  Patient requires further consultation with: None    Clearance  Patient is CLEARED for surgery without any additional cardiac testing  Problem List Items Addressed This Visit        Other    Cortical age-related cataract of both eyes - Primary     - patient is medically cleared for cataract extraction under MAC anesthesia    No further workup is necessary         Relevant Medications    neomycin-polymyxin-dexamethasone (MAXITROL) ophthalmic suspension        DO Yasmin Doshi [address]  [address] [address]  Phone#  [phone]  Fax#  [phone]

## 2020-08-29 DIAGNOSIS — F51.01 PRIMARY INSOMNIA: ICD-10-CM

## 2020-08-31 RX ORDER — ZOLPIDEM TARTRATE 10 MG/1
10 TABLET ORAL
Qty: 30 TABLET | Refills: 3 | Status: SHIPPED | OUTPATIENT
Start: 2020-08-31 | End: 2020-12-18 | Stop reason: SDUPTHER

## 2020-09-27 DIAGNOSIS — J30.2 SEASONAL ALLERGIES: ICD-10-CM

## 2020-09-27 DIAGNOSIS — I73.00 RAYNAUD'S DISEASE WITHOUT GANGRENE: ICD-10-CM

## 2020-09-28 RX ORDER — MONTELUKAST SODIUM 10 MG/1
10 TABLET ORAL
Qty: 90 TABLET | Refills: 0 | Status: SHIPPED | OUTPATIENT
Start: 2020-09-28 | End: 2020-12-30 | Stop reason: SDUPTHER

## 2020-09-28 RX ORDER — DILTIAZEM HYDROCHLORIDE 60 MG/1
60 TABLET, FILM COATED ORAL 3 TIMES DAILY
Qty: 270 TABLET | Refills: 0 | Status: SHIPPED | OUTPATIENT
Start: 2020-09-28 | End: 2022-01-18 | Stop reason: SDUPTHER

## 2020-12-18 DIAGNOSIS — F51.01 PRIMARY INSOMNIA: ICD-10-CM

## 2020-12-18 RX ORDER — ZOLPIDEM TARTRATE 10 MG/1
10 TABLET ORAL
Qty: 30 TABLET | Refills: 3 | Status: SHIPPED | OUTPATIENT
Start: 2020-12-18 | End: 2021-04-02 | Stop reason: SDUPTHER

## 2020-12-30 DIAGNOSIS — J30.2 SEASONAL ALLERGIES: ICD-10-CM

## 2020-12-30 RX ORDER — MONTELUKAST SODIUM 10 MG/1
10 TABLET ORAL
Qty: 90 TABLET | Refills: 3 | Status: SHIPPED | OUTPATIENT
Start: 2020-12-30 | End: 2022-01-18 | Stop reason: SDUPTHER

## 2021-03-10 DIAGNOSIS — Z23 ENCOUNTER FOR IMMUNIZATION: ICD-10-CM

## 2021-03-17 ENCOUNTER — IMMUNIZATIONS (OUTPATIENT)
Dept: FAMILY MEDICINE CLINIC | Facility: HOSPITAL | Age: 66
End: 2021-03-17

## 2021-03-17 DIAGNOSIS — Z23 ENCOUNTER FOR IMMUNIZATION: Primary | ICD-10-CM

## 2021-03-17 PROCEDURE — 0001A SARS-COV-2 / COVID-19 MRNA VACCINE (PFIZER-BIONTECH) 30 MCG: CPT

## 2021-03-17 PROCEDURE — 91300 SARS-COV-2 / COVID-19 MRNA VACCINE (PFIZER-BIONTECH) 30 MCG: CPT

## 2021-04-02 DIAGNOSIS — F51.01 PRIMARY INSOMNIA: ICD-10-CM

## 2021-04-02 RX ORDER — ZOLPIDEM TARTRATE 10 MG/1
10 TABLET ORAL
Qty: 30 TABLET | Refills: 3 | Status: SHIPPED | OUTPATIENT
Start: 2021-04-02 | End: 2021-04-29 | Stop reason: SDUPTHER

## 2021-04-10 ENCOUNTER — IMMUNIZATIONS (OUTPATIENT)
Dept: FAMILY MEDICINE CLINIC | Facility: HOSPITAL | Age: 66
End: 2021-04-10

## 2021-04-10 DIAGNOSIS — Z23 ENCOUNTER FOR IMMUNIZATION: Primary | ICD-10-CM

## 2021-04-10 PROCEDURE — 91300 SARS-COV-2 / COVID-19 MRNA VACCINE (PFIZER-BIONTECH) 30 MCG: CPT

## 2021-04-10 PROCEDURE — 0002A SARS-COV-2 / COVID-19 MRNA VACCINE (PFIZER-BIONTECH) 30 MCG: CPT

## 2021-04-28 DIAGNOSIS — F51.01 PRIMARY INSOMNIA: ICD-10-CM

## 2021-04-28 RX ORDER — ZOLPIDEM TARTRATE 10 MG/1
10 TABLET ORAL
Qty: 30 TABLET | Refills: 0 | Status: CANCELLED | OUTPATIENT
Start: 2021-04-28

## 2021-04-29 DIAGNOSIS — F51.01 PRIMARY INSOMNIA: ICD-10-CM

## 2021-04-29 NOTE — TELEPHONE ENCOUNTER
PDMP checked and last refill was 04/05/2021 written by you and filled at [facility]  She is requesting a little early  I will also call to schedule her for her next follow up

## 2021-05-03 RX ORDER — ZOLPIDEM TARTRATE 10 MG/1
10 TABLET ORAL
Qty: 30 TABLET | Refills: 0 | Status: SHIPPED | OUTPATIENT
Start: 2021-05-03 | End: 2021-06-02 | Stop reason: SDUPTHER

## 2021-06-01 DIAGNOSIS — F51.01 PRIMARY INSOMNIA: ICD-10-CM

## 2021-06-01 RX ORDER — ZOLPIDEM TARTRATE 10 MG/1
10 TABLET ORAL
Qty: 30 TABLET | Refills: 0 | Status: CANCELLED | OUTPATIENT
Start: 2021-06-01

## 2021-06-02 DIAGNOSIS — F51.01 PRIMARY INSOMNIA: ICD-10-CM

## 2021-06-02 RX ORDER — ZOLPIDEM TARTRATE 10 MG/1
10 TABLET ORAL
Qty: 30 TABLET | Refills: 0 | Status: SHIPPED | OUTPATIENT
Start: 2021-06-02 | End: 2021-06-21 | Stop reason: SDUPTHER

## 2021-06-02 NOTE — TELEPHONE ENCOUNTER
PDMP checked and last fill was 05/03/2021 for #30 written by you and filled at [facility]  She did call back and schedule an appointment for 06/21 can you send #30 to the pharmacy

## 2021-06-02 NOTE — TELEPHONE ENCOUNTER
Sent a message through [address] for Gretta Styles to schedule an appointment  She was last seen 08/2020 and canceled her 4/2021 appointment after last refill  Please refuse medication until she is scheduled

## 2021-06-21 ENCOUNTER — OFFICE VISIT (OUTPATIENT)
Dept: FAMILY MEDICINE CLINIC | Facility: CLINIC | Age: 66
End: 2021-06-21
Payer: COMMERCIAL

## 2021-06-21 VITALS
SYSTOLIC BLOOD PRESSURE: 124 MMHG | DIASTOLIC BLOOD PRESSURE: 74 MMHG | RESPIRATION RATE: 16 BRPM | WEIGHT: 201 LBS | HEART RATE: 62 BPM | BODY MASS INDEX: 32.3 KG/M2 | HEIGHT: 66 IN | OXYGEN SATURATION: 98 %

## 2021-06-21 DIAGNOSIS — Z12.31 ENCOUNTER FOR SCREENING MAMMOGRAM FOR MALIGNANT NEOPLASM OF BREAST: Primary | ICD-10-CM

## 2021-06-21 DIAGNOSIS — Z00.00 ANNUAL PHYSICAL EXAM: ICD-10-CM

## 2021-06-21 DIAGNOSIS — F51.01 PRIMARY INSOMNIA: ICD-10-CM

## 2021-06-21 PROBLEM — H25.013 CORTICAL AGE-RELATED CATARACT OF BOTH EYES: Status: RESOLVED | Noted: 2020-08-06 | Resolved: 2021-06-21

## 2021-06-21 PROCEDURE — 1036F TOBACCO NON-USER: CPT | Performed by: FAMILY MEDICINE

## 2021-06-21 PROCEDURE — 99396 PREV VISIT EST AGE 40-64: CPT | Performed by: FAMILY MEDICINE

## 2021-06-21 PROCEDURE — 3008F BODY MASS INDEX DOCD: CPT | Performed by: FAMILY MEDICINE

## 2021-06-21 PROCEDURE — 3725F SCREEN DEPRESSION PERFORMED: CPT | Performed by: FAMILY MEDICINE

## 2021-06-21 RX ORDER — ZOLPIDEM TARTRATE 10 MG/1
10 TABLET ORAL
Qty: 30 TABLET | Refills: 5 | Status: SHIPPED | OUTPATIENT
Start: 2021-06-21 | End: 2021-12-16

## 2021-06-21 NOTE — ASSESSMENT & PLAN NOTE
-annual physical examination performed    -order provided for screening blood work    -order provided for screening mammogram

## 2021-06-21 NOTE — PROGRESS NOTES
Subjective:      Patient ID: Maribeth Hidalgo is a 61 y o  female  30-year-old female presents for annual physical examination  Patient did have cataract surgery performed last year  Overall feeling well  Patient is current with screening DEXA scan  She does need order for screening mammogram   Patient feels that she does sleep very well when taking p r n  Ambien  If she does miss doses of Ambien then she has very vivid nightmares and disrupted sleep  She does request refills of p r n  Ambien  No recent labs completed          Past Medical History:   Diagnosis Date    Cortical age-related cataract of both eyes 8/6/2020    Ovarian cyst     Right    Raynaud phenomenon        Family History   Problem Relation Age of Onset    Other Brother         Brain tumor     Diabetes Mother     Osteoarthritis Mother     Diabetes Father     Aneurysm Father     No Known Problems Brother        Past Surgical History:   Procedure Laterality Date    CAUTERIZE INNER NOSE      REPLACEMENT TOTAL KNEE Bilateral     2012/2013        reports that she has never smoked  She has never used smokeless tobacco  She reports current alcohol use  She reports that she does not use drugs        Current Outpatient Medications:     diltiazem (CARDIZEM) 60 mg tablet, Take 1 tablet (60 mg total) by mouth 3 (three) times a day replacing extended release formulation, Disp: 270 tablet, Rfl: 0    montelukast (SINGULAIR) 10 mg tablet, Take 1 tablet (10 mg total) by mouth daily at bedtime, Disp: 90 tablet, Rfl: 3    multivitamin (THERAGRAN) TABS, Take 1 tablet by mouth daily, Disp: , Rfl:     zolpidem (AMBIEN) 10 mg tablet, Take 1 tablet (10 mg total) by mouth daily at bedtime as needed for sleep, Disp: 30 tablet, Rfl: 5    neomycin-polymyxin-dexamethasone (MAXITROL) ophthalmic suspension, , Disp: , Rfl:     The following portions of the patient's history were reviewed and updated as appropriate: allergies, current medications, past family history, past medical history, past social history, past surgical history and problem list     Review of Systems   Constitutional: Negative  HENT: Negative  Eyes: Negative  Respiratory: Negative  Cardiovascular: Negative  Gastrointestinal: Negative  Endocrine: Negative  Genitourinary: Negative  Musculoskeletal: Negative  Skin: Negative  Allergic/Immunologic: Negative  Neurological: Negative  Hematological: Negative  Psychiatric/Behavioral: Negative  Objective:    /74   Pulse 62   Resp 16   Ht 5' 6" (1 676 m)   Wt 91 2 kg (201 lb)   SpO2 98%   BMI 32 44 kg/m²      Physical Exam  Vitals and nursing note reviewed  Constitutional:       General: She is not in acute distress  Appearance: She is well-developed  She is not diaphoretic  HENT:      Head: Normocephalic and atraumatic  Right Ear: Hearing, tympanic membrane, ear canal and external ear normal       Left Ear: Hearing, tympanic membrane and external ear normal       Nose: Mucosal edema present  No nasal deformity, septal deviation, congestion or rhinorrhea  Right Sinus: No maxillary sinus tenderness or frontal sinus tenderness  Left Sinus: No maxillary sinus tenderness or frontal sinus tenderness  Mouth/Throat:      Pharynx: No oropharyngeal exudate  Eyes:      Conjunctiva/sclera: Conjunctivae normal       Pupils: Pupils are equal, round, and reactive to light  Cardiovascular:      Rate and Rhythm: Normal rate and regular rhythm  Heart sounds: Normal heart sounds  No murmur heard  Pulmonary:      Effort: Pulmonary effort is normal  No accessory muscle usage or respiratory distress  Breath sounds: Normal breath sounds  No wheezing, rhonchi or rales  Abdominal:      General: Bowel sounds are normal       Palpations: Abdomen is soft  Musculoskeletal:         General: Normal range of motion  Cervical back: Normal range of motion and neck supple  Lymphadenopathy:      Cervical: No cervical adenopathy  Skin:     General: Skin is warm and dry  Findings: No rash  Neurological:      Mental Status: She is alert and oriented to person, place, and time  Deep Tendon Reflexes: Reflexes are normal and symmetric  Psychiatric:         Behavior: Behavior normal          Thought Content: Thought content normal          Judgment: Judgment normal            No results found for this or any previous visit (from the past 1008 hour(s))  Assessment/Plan:    Annual physical exam  -annual physical examination performed    -order provided for screening blood work    -order provided for screening mammogram    Encounter for screening mammogram for malignant neoplasm of breast  Order provided for screening mammogram    Primary insomnia  P r n  Ambien helps with her episodes of insomnia  Refill provided  Prior to prescribing the controlled substance, a patient search was performed on the [place] prescription drug monitoring program web site  There was no evidence of diversion or misuse  Prescription provided            Problem List Items Addressed This Visit        Other    Annual physical exam     -annual physical examination performed    -order provided for screening blood work    -order provided for screening mammogram         Relevant Orders    CBC and differential    Comprehensive metabolic panel    TSH, 3rd generation with Free T4 reflex    Lipid panel    Encounter for screening mammogram for malignant neoplasm of breast - Primary     Order provided for screening mammogram         Relevant Orders    Mammo screening bilateral w 3d & cad    Primary insomnia     P r n  Ambien helps with her episodes of insomnia  Refill provided  Prior to prescribing the controlled substance, a patient search was performed on the [place] prescription drug monitoring program web site  There was no evidence of diversion or misuse    Prescription provided Relevant Medications    zolpidem (AMBIEN) 10 mg tablet    Other Relevant Orders    TSH, 3rd generation with Free T4 reflex            BMI Counseling: Body mass index is 32 44 kg/m²  The BMI is above normal  Exercise recommendations include exercising 3-5 times per week

## 2021-06-21 NOTE — ASSESSMENT & PLAN NOTE
P r n  Ambien helps with her episodes of insomnia  Refill provided  Prior to prescribing the controlled substance, a patient search was performed on the [place] prescription drug monitoring program web site  There was no evidence of diversion or misuse    Prescription provided

## 2021-07-01 LAB
ALBUMIN SERPL-MCNC: 4.1 G/DL (ref 3.6–5.1)
ALBUMIN/GLOB SERPL: 1.6 (CALC) (ref 1–2.5)
ALP SERPL-CCNC: 88 U/L (ref 37–153)
ALT SERPL-CCNC: 16 U/L (ref 6–29)
AST SERPL-CCNC: 16 U/L (ref 10–35)
BASOPHILS # BLD AUTO: 57 CELLS/UL (ref 0–200)
BASOPHILS NFR BLD AUTO: 1.1 %
BILIRUB SERPL-MCNC: 0.7 MG/DL (ref 0.2–1.2)
BUN SERPL-MCNC: 23 MG/DL (ref 7–25)
BUN/CREAT SERPL: ABNORMAL (CALC) (ref 6–22)
CALCIUM SERPL-MCNC: 9.4 MG/DL (ref 8.6–10.4)
CHLORIDE SERPL-SCNC: 104 MMOL/L (ref 98–110)
CHOLEST SERPL-MCNC: 239 MG/DL
CHOLEST/HDLC SERPL: 2.3 (CALC)
CO2 SERPL-SCNC: 31 MMOL/L (ref 20–32)
CREAT SERPL-MCNC: 0.79 MG/DL (ref 0.5–0.99)
EOSINOPHIL # BLD AUTO: 130 CELLS/UL (ref 15–500)
EOSINOPHIL NFR BLD AUTO: 2.5 %
ERYTHROCYTE [DISTWIDTH] IN BLOOD BY AUTOMATED COUNT: 12.8 % (ref 11–15)
GLOBULIN SER CALC-MCNC: 2.5 G/DL (CALC) (ref 1.9–3.7)
GLUCOSE SERPL-MCNC: 105 MG/DL (ref 65–99)
HCT VFR BLD AUTO: 38.1 % (ref 35–45)
HDLC SERPL-MCNC: 103 MG/DL
HGB BLD-MCNC: 12.4 G/DL (ref 11.7–15.5)
LDLC SERPL CALC-MCNC: 121 MG/DL (CALC)
LYMPHOCYTES # BLD AUTO: 1992 CELLS/UL (ref 850–3900)
LYMPHOCYTES NFR BLD AUTO: 38.3 %
MCH RBC QN AUTO: 29.3 PG (ref 27–33)
MCHC RBC AUTO-ENTMCNC: 32.5 G/DL (ref 32–36)
MCV RBC AUTO: 90.1 FL (ref 80–100)
MONOCYTES # BLD AUTO: 556 CELLS/UL (ref 200–950)
MONOCYTES NFR BLD AUTO: 10.7 %
NEUTROPHILS # BLD AUTO: 2465 CELLS/UL (ref 1500–7800)
NEUTROPHILS NFR BLD AUTO: 47.4 %
NONHDLC SERPL-MCNC: 136 MG/DL (CALC)
PLATELET # BLD AUTO: 203 THOUSAND/UL (ref 140–400)
PMV BLD REES-ECKER: 11.6 FL (ref 7.5–12.5)
POTASSIUM SERPL-SCNC: 4.6 MMOL/L (ref 3.5–5.3)
PROT SERPL-MCNC: 6.6 G/DL (ref 6.1–8.1)
RBC # BLD AUTO: 4.23 MILLION/UL (ref 3.8–5.1)
SL AMB EGFR AFRICAN AMERICAN: 92 ML/MIN/1.73M2
SL AMB EGFR NON AFRICAN AMERICAN: 79 ML/MIN/1.73M2
SODIUM SERPL-SCNC: 140 MMOL/L (ref 135–146)
TRIGL SERPL-MCNC: 49 MG/DL
TSH SERPL-ACNC: 3.31 MIU/L (ref 0.4–4.5)
WBC # BLD AUTO: 5.2 THOUSAND/UL (ref 3.8–10.8)

## 2021-07-02 NOTE — RESULT ENCOUNTER NOTE
Please call patient and notify that results are normal   Excellent cholesterol profile with HDL of 103 and LDL of 121  Remainder of labs are normal  No changes in medications   Please keep follow-up appointment as scheduled

## 2021-09-17 ENCOUNTER — IMMUNIZATIONS (OUTPATIENT)
Dept: FAMILY MEDICINE CLINIC | Facility: CLINIC | Age: 66
End: 2021-09-17
Payer: COMMERCIAL

## 2021-09-17 DIAGNOSIS — Z23 ENCOUNTER FOR IMMUNIZATION: Primary | ICD-10-CM

## 2021-09-17 PROCEDURE — 90682 RIV4 VACC RECOMBINANT DNA IM: CPT

## 2021-09-17 PROCEDURE — 90471 IMMUNIZATION ADMIN: CPT

## 2021-12-07 ENCOUNTER — HOSPITAL ENCOUNTER (OUTPATIENT)
Dept: MAMMOGRAPHY | Facility: HOSPITAL | Age: 66
Discharge: HOME | End: 2021-12-07
Payer: COMMERCIAL

## 2021-12-07 VITALS — HEIGHT: 66 IN | WEIGHT: 201.06 LBS | BODY MASS INDEX: 32.31 KG/M2

## 2021-12-07 DIAGNOSIS — Z12.31 ENCOUNTER FOR SCREENING MAMMOGRAM FOR MALIGNANT NEOPLASM OF BREAST: ICD-10-CM

## 2021-12-07 PROCEDURE — 77067 SCR MAMMO BI INCL CAD: CPT

## 2021-12-07 PROCEDURE — 77063 BREAST TOMOSYNTHESIS BI: CPT

## 2021-12-09 ENCOUNTER — TELEPHONE (OUTPATIENT)
Dept: FAMILY MEDICINE CLINIC | Facility: CLINIC | Age: 66
End: 2021-12-09

## 2022-01-18 ENCOUNTER — OFFICE VISIT (OUTPATIENT)
Dept: FAMILY MEDICINE CLINIC | Facility: CLINIC | Age: 67
End: 2022-01-18
Payer: COMMERCIAL

## 2022-01-18 VITALS
HEART RATE: 62 BPM | HEIGHT: 66 IN | SYSTOLIC BLOOD PRESSURE: 128 MMHG | BODY MASS INDEX: 34.55 KG/M2 | RESPIRATION RATE: 16 BRPM | WEIGHT: 215 LBS | DIASTOLIC BLOOD PRESSURE: 74 MMHG | OXYGEN SATURATION: 98 %

## 2022-01-18 DIAGNOSIS — J30.2 SEASONAL ALLERGIES: ICD-10-CM

## 2022-01-18 DIAGNOSIS — Z00.00 ANNUAL PHYSICAL EXAM: Primary | ICD-10-CM

## 2022-01-18 DIAGNOSIS — E55.9 VITAMIN D DEFICIENCY: ICD-10-CM

## 2022-01-18 DIAGNOSIS — I73.00 RAYNAUD'S DISEASE WITHOUT GANGRENE: ICD-10-CM

## 2022-01-18 DIAGNOSIS — F51.01 PRIMARY INSOMNIA: ICD-10-CM

## 2022-01-18 PROBLEM — Z11.59 NEED FOR HEPATITIS C SCREENING TEST: Status: RESOLVED | Noted: 2019-05-31 | Resolved: 2022-01-18

## 2022-01-18 PROCEDURE — 3725F SCREEN DEPRESSION PERFORMED: CPT | Performed by: FAMILY MEDICINE

## 2022-01-18 PROCEDURE — 99214 OFFICE O/P EST MOD 30 MIN: CPT | Performed by: FAMILY MEDICINE

## 2022-01-18 PROCEDURE — 1036F TOBACCO NON-USER: CPT | Performed by: FAMILY MEDICINE

## 2022-01-18 PROCEDURE — 3008F BODY MASS INDEX DOCD: CPT | Performed by: FAMILY MEDICINE

## 2022-01-18 RX ORDER — MONTELUKAST SODIUM 10 MG/1
10 TABLET ORAL
Qty: 90 TABLET | Refills: 3 | Status: SHIPPED | OUTPATIENT
Start: 2022-01-18

## 2022-01-18 RX ORDER — DILTIAZEM HYDROCHLORIDE 60 MG/1
60 TABLET, FILM COATED ORAL 3 TIMES DAILY
Qty: 270 TABLET | Refills: 0 | Status: SHIPPED | OUTPATIENT
Start: 2022-01-18 | End: 2022-07-21

## 2022-01-18 NOTE — ASSESSMENT & PLAN NOTE
P r n  Ambien does help with her episodes of insomnia    She does not presently need refills but will contact the office in refills are necessary

## 2022-01-18 NOTE — ASSESSMENT & PLAN NOTE
Patient remains on over-the-counter vitamin-D supplementation    Check vitamin-D level next set of labs in 6 months

## 2022-01-18 NOTE — PROGRESS NOTES
Subjective:      Patient ID: Tacey Valorie is a 61 y o  female  53-year-old female presents for annual physical examination and follow-up of chronic conditions including Raynaud's phenomenon, chronic insomnia and seasonal allergies  All of her medications are very effective for her  No recent labs  Current with colon cancer screening, immunizations as well as breast cancer screening  Younger brother was diagnosed with multiple myeloma      Past Medical History:   Diagnosis Date    Cortical age-related cataract of both eyes 8/6/2020    Ovarian cyst     Right    Raynaud phenomenon        Family History   Problem Relation Age of Onset    Other Brother         Brain tumor     Diabetes Mother     Osteoarthritis Mother     Diabetes Father     Aneurysm Father     Other Brother     Multiple myeloma Brother     No Known Problems Paternal Aunt        Past Surgical History:   Procedure Laterality Date    CAUTERIZE INNER NOSE      REPLACEMENT TOTAL KNEE Bilateral     2012/2013        reports that she has never smoked  She has never used smokeless tobacco  She reports current alcohol use  She reports that she does not use drugs        Current Outpatient Medications:     diltiazem (CARDIZEM) 60 mg tablet, Take 1 tablet (60 mg total) by mouth 3 (three) times a day replacing extended release formulation, Disp: 270 tablet, Rfl: 0    montelukast (SINGULAIR) 10 mg tablet, Take 1 tablet (10 mg total) by mouth daily at bedtime, Disp: 90 tablet, Rfl: 3    multivitamin (THERAGRAN) TABS, Take 1 tablet by mouth daily, Disp: , Rfl:     neomycin-polymyxin-dexamethasone (MAXITROL) ophthalmic suspension, , Disp: , Rfl:     zolpidem (AMBIEN) 10 mg tablet, Take 1 tablet (10 mg total) by mouth daily at bedtime as needed for sleep, Disp: 30 tablet, Rfl: 5    The following portions of the patient's history were reviewed and updated as appropriate: allergies, current medications, past family history, past medical history, past social history, past surgical history and problem list     Review of Systems   Constitutional: Negative  HENT: Negative  Eyes: Negative  Respiratory: Negative  Cardiovascular: Negative  Gastrointestinal: Negative  Endocrine: Negative  Genitourinary: Negative  Musculoskeletal: Negative  Skin: Negative  Allergic/Immunologic: Negative  Neurological: Negative  Hematological: Negative  Psychiatric/Behavioral: Negative  Objective:    /74   Pulse 62   Resp 16   Ht 5' 6" (1 676 m)   Wt 97 5 kg (215 lb)   SpO2 98%   BMI 34 70 kg/m²      Physical Exam  Vitals and nursing note reviewed  Constitutional:       General: She is not in acute distress  Appearance: Normal appearance  She is well-developed and normal weight  She is not diaphoretic  HENT:      Head: Normocephalic and atraumatic  Right Ear: Tympanic membrane, ear canal and external ear normal       Left Ear: Tympanic membrane, ear canal and external ear normal       Nose: Nose normal    Eyes:      Extraocular Movements: Extraocular movements intact  Conjunctiva/sclera: Conjunctivae normal       Pupils: Pupils are equal, round, and reactive to light  Cardiovascular:      Rate and Rhythm: Normal rate and regular rhythm  Heart sounds: Normal heart sounds  No murmur heard  Pulmonary:      Effort: Pulmonary effort is normal       Breath sounds: Normal breath sounds  Abdominal:      General: Bowel sounds are normal       Palpations: Abdomen is soft  Musculoskeletal:         General: Normal range of motion  Cervical back: Normal range of motion and neck supple  Skin:     General: Skin is warm and dry  Findings: No rash  Neurological:      General: No focal deficit present  Mental Status: She is alert and oriented to person, place, and time  Mental status is at baseline  Deep Tendon Reflexes: Reflexes are normal and symmetric     Psychiatric: Mood and Affect: Mood normal          Behavior: Behavior normal          Thought Content: Thought content normal          Judgment: Judgment normal            No results found for this or any previous visit (from the past 1008 hour(s))  Assessment/Plan:    Raynaud's disease without gangrene  Remains very well controlled on diltiazem 60 mg t i d  Refill provided  Annual physical exam  Annual physical examination performed    -order provided for screening blood work     -current with screening mammogram, colon cancer screening    Vitamin D deficiency  Patient remains on over-the-counter vitamin-D supplementation  Check vitamin-D level next set of labs in 6 months    Primary insomnia  P r jennifer Landerosien does help with her episodes of insomnia  She does not presently need refills but will contact the office in refills are necessary    Seasonal allergies  Seasonal allergies are very well controlled on Singulair 10 mg once daily  Refill provided          Problem List Items Addressed This Visit        Cardiovascular and Mediastinum    Raynaud's disease without gangrene     Remains very well controlled on diltiazem 60 mg t i d  Refill provided  Relevant Medications    diltiazem (CARDIZEM) 60 mg tablet       Other    Annual physical exam - Primary     Annual physical examination performed    -order provided for screening blood work     -current with screening mammogram, colon cancer screening         Relevant Orders    CBC and differential    Comprehensive metabolic panel    TSH, 3rd generation with Free T4 reflex    Vitamin D 1,25 dihydroxy    Lipid panel    Primary insomnia     P r jennifer Landerosien does help with her episodes of insomnia  She does not presently need refills but will contact the office in refills are necessary         Seasonal allergies     Seasonal allergies are very well controlled on Singulair 10 mg once daily    Refill provided         Relevant Medications    montelukast (SINGULAIR) 10 mg tablet    Vitamin D deficiency     Patient remains on over-the-counter vitamin-D supplementation    Check vitamin-D level next set of labs in 6 months         Relevant Orders    Vitamin D 1,25 dihydroxy

## 2022-01-18 NOTE — ASSESSMENT & PLAN NOTE
Annual physical examination performed    -order provided for screening blood work     -current with screening mammogram, colon cancer screening

## 2022-03-05 ENCOUNTER — OFFICE VISIT (OUTPATIENT)
Dept: URGENT CARE | Facility: CLINIC | Age: 67
End: 2022-03-05
Payer: COMMERCIAL

## 2022-03-05 VITALS
SYSTOLIC BLOOD PRESSURE: 167 MMHG | RESPIRATION RATE: 16 BRPM | HEART RATE: 72 BPM | HEIGHT: 66 IN | BODY MASS INDEX: 34.55 KG/M2 | WEIGHT: 215 LBS | DIASTOLIC BLOOD PRESSURE: 70 MMHG

## 2022-03-05 DIAGNOSIS — M54.32 SCIATICA OF LEFT SIDE: Primary | ICD-10-CM

## 2022-03-05 DIAGNOSIS — M79.605 LEG PAIN, POSTERIOR, LEFT: ICD-10-CM

## 2022-03-05 PROCEDURE — G0382 LEV 3 HOSP TYPE B ED VISIT: HCPCS | Performed by: PHYSICIAN ASSISTANT

## 2022-03-05 RX ORDER — PREDNISONE 50 MG/1
50 TABLET ORAL DAILY
Qty: 5 TABLET | Refills: 0 | Status: SHIPPED | OUTPATIENT
Start: 2022-03-05 | End: 2022-03-10

## 2022-03-05 NOTE — PROGRESS NOTES
[facility] [facility]        NAME: Rin Murcia is a 61 y o  female  : 1955    MRN: [id number]  DATE: 2022  TIME: 9:12 AM    Assessment and Plan   Sciatica of left side [M54 32]  1  Sciatica of left side  Ambulatory Referral to [facility]    predniSONE 50 mg tablet   2  Leg pain, posterior, left  Ambulatory Referral to [facility]    predniSONE 50 mg tablet         Patient Instructions     Begin steroid as discussed  Take this medication closer to the morning to prevent difficulty with sleep  Use Tylenol as needed help with additional pain  Warm heat to the lower back  Gentle range-of-motion stretching  Follow-up with Dr Yari Caba as needed or follow-up with comprehensive spine program as discussed  Follow up with PCP in 3-5 days  Proceed to  ER if symptoms worsen  Chief Complaint     Chief Complaint   Patient presents with    Leg Pain     states of pain in back of knee and thigh of L leg  Started abruptly during the  Had this previously  Made appt with ortho because she has knee replacement  When she got to Ortho she no longer had the pain so thsy couldn't tell her much  States they manipulated the knee and told her it was fine  History of Present Illness       30-year-old female presents the office for pain in posterior distal hamstring and knee that has been present for her at 3-4 a m  this morning  Patient notes that she had similar episode of this approximately 1 5 months ago which lasted 2 days  Occasional so have a twinge of pain with walking  Patient notes that pain is primarily in the hamstring and back of her knee  She denies any radiation down to lower leg  Denies any calf pain  Denies any swelling bruising redness  Denies any recent trauma to the area  Denies any significant pain in her lower back patient does have past history of bilateral knee replacements    She did see Orthopedics for her last episode however did not have pain at that time  Per Orthopedics notes potential sciatica  Thus far she has been using over-the-counter NSAIDs as well as ice to the area  She notes pain is improved this current moment  No specific lotions increases pain  Otherwise feeling well  Denies any fevers or chills  Review of Systems   Review of Systems   Constitutional: Negative for chills and fever  Respiratory: Negative  Cardiovascular: Negative  Genitourinary: Negative  Musculoskeletal: Positive for arthralgias, gait problem and myalgias  Skin: Negative  Neurological: Negative for numbness           Current Medications       Current Outpatient Medications:     diltiazem (CARDIZEM) 60 mg tablet, Take 1 tablet (60 mg total) by mouth 3 (three) times a day replacing extended release formulation, Disp: 270 tablet, Rfl: 0    montelukast (SINGULAIR) 10 mg tablet, Take 1 tablet (10 mg total) by mouth daily at bedtime, Disp: 90 tablet, Rfl: 3    multivitamin (THERAGRAN) TABS, Take 1 tablet by mouth daily, Disp: , Rfl:     neomycin-polymyxin-dexamethasone (MAXITROL) ophthalmic suspension, , Disp: , Rfl:     zolpidem (AMBIEN) 10 mg tablet, Take 1 tablet (10 mg total) by mouth daily at bedtime as needed for sleep, Disp: 30 tablet, Rfl: 5    predniSONE 50 mg tablet, Take 1 tablet (50 mg total) by mouth daily for 5 days, Disp: 5 tablet, Rfl: 0    Current Allergies     Allergies as of 03/05/2022    (No Known Allergies)            The following portions of the patient's history were reviewed and updated as appropriate: allergies, current medications, past family history, past medical history, past social history, past surgical history and problem list      Past Medical History:   Diagnosis Date    Cortical age-related cataract of both eyes 8/6/2020    Ovarian cyst     Right    Raynaud phenomenon        Past Surgical History:   Procedure Laterality Date    CAUTERIZE INNER NOSE      REPLACEMENT TOTAL KNEE Bilateral     2012/2013 Family History   Problem Relation Age of Onset    Other Brother         Brain tumor     Diabetes Mother     Osteoarthritis Mother     Diabetes Father     Aneurysm Father     Other Brother     Multiple myeloma Brother     No Known Problems Paternal Aunt          Medications have been verified  Objective   /70 (Patient Position: Sitting)   Pulse 72   Resp 16   Ht 5' 6" (1 676 m)   Wt 97 5 kg (215 lb)   BMI 34 70 kg/m²   No LMP recorded  Patient is postmenopausal        Physical Exam     Physical Exam  Vitals and nursing note reviewed  Constitutional:       General: She is not in acute distress  Appearance: She is well-developed  She is not ill-appearing or diaphoretic  Comments: Pleasant friendly female   HENT:      Head: Normocephalic and atraumatic  Right Ear: Hearing and external ear normal       Left Ear: Hearing and external ear normal       Nose: Nose normal    Eyes:      Conjunctiva/sclera: Conjunctivae normal       Pupils: Pupils are equal, round, and reactive to light  Cardiovascular:      Rate and Rhythm: Normal rate and regular rhythm  Pulses: Normal pulses  Heart sounds: Normal heart sounds  No murmur heard  No friction rub  No gallop  Pulmonary:      Effort: Pulmonary effort is normal  No respiratory distress  Breath sounds: Normal breath sounds  No wheezing or rales  Abdominal:      General: Abdomen is flat  Musculoskeletal:         General: No tenderness or deformity  Normal range of motion  Cervical back: Normal and normal range of motion  Lumbar back: Normal       Right knee: Normal       Left knee: Normal       Comments: No tenderness to palpation of bilateral knees  No tenderness to palpation of musculature of both anterior posterior thigh  No noted swelling  Full active range of motion  Gait appears nonantalgic at this time  Strength 5/5 flexion and extension at knee   Skin:     General: Skin is warm and dry  Capillary Refill: Capillary refill takes less than 2 seconds  Findings: No ecchymosis, erythema or laceration  Neurological:      Mental Status: She is alert  Sensory: No sensory deficit  Motor: No abnormal muscle tone  Deep Tendon Reflexes: Reflexes normal    Psychiatric:         Behavior: Behavior is cooperative

## 2022-03-05 NOTE — PATIENT INSTRUCTIONS
Begin steroid as discussed  Take this medication closer to the morning to prevent difficulty with sleep  Use Tylenol as needed help with additional pain  Warm heat to the lower back  Gentle range-of-motion stretching  Follow-up with Dr Milena Burnham as needed or follow-up with comprehensive spine program as discussed    Lower Back Exercises   WHAT YOU NEED TO KNOW:   Lower back exercises help heal and strengthen your back muscles to prevent another injury  Ask your healthcare provider if you need to see a physical therapist for more advanced exercises  DISCHARGE INSTRUCTIONS:   Return to the emergency department if:   · You have severe pain that prevents you from moving  Contact your healthcare provider if:   · Your pain becomes worse  · You have new pain  · You have questions or concerns about your condition or care  Do lower back exercises safely:   · Do the exercises on a mat or firm surface  (not on a bed) to support your spine and prevent low back pain  · Move slowly and smoothly  Avoid fast or jerky motions  · Breathe normally  Do not hold your breath  · Stop if you feel pain  It is normal to feel some discomfort at first  Regular exercise will help decrease your discomfort over time  Lower back exercises: Your healthcare provider may recommend that you do back exercises 10 to 30 minutes each day  He may also recommend that you do exercises 1 to 3 times each day  Ask your healthcare provider which exercises are best for you and how often to do them  · Ankle pumps:  Lie on your back  Move your foot up (with your toes pointing toward your head)  Then, move your foot down (with your toes pointing away from you)  Repeat this exercise 10 times on each side  · Heel slides:  Lie on your back  Slowly bend one leg and then straighten it  Next, bend the other leg and then straighten it  Repeat 10 times on each side           · Pelvic tilt:  Lie on your back with your knees bent and feet flat on the floor  Place your arms in a relaxed position beside your body  Tighten the muscles of your abdomen and flatten your back against the floor  Hold for 5 seconds  Repeat 5 times  · Back stretch:  Lie on your back with your hands behind your head  Bend your knees and turn the lower half of your body to one side  Hold this position for 10 seconds  Repeat 3 times on each side  · Straight leg raises:  Lie on your back with one leg straight  Bend the other knee  Tighten your abdomen and then slowly lift the straight leg up about 6 to 12 inches off the floor  Hold for 1 to 5 seconds  Lower your leg slowly  Repeat 10 times on each leg  · Knee-to-chest:  Lie on your back with your knees bent and feet flat on the floor  Pull one of your knees toward your chest and hold it there for 5 seconds  Return your leg to the starting position  Lift the other knee toward your chest and hold for 5 seconds  Do this 5 times on each side  · Cat and camel:  Place your hands and knees on the floor  Arch your back upward toward the ceiling and lower your head  Round out your spine as much as you can  Hold for 5 seconds  Lift your head upward and push your chest downward toward the floor  Hold for 5 seconds  Do 3 sets or as directed  · Wall squats:  Stand with your back against a wall  Tighten the muscles of your abdomen  Slowly lower your body until your knees are bent at a 45 degree angle  Hold this position for 5 seconds  Slowly move back up to a standing position  Repeat 10 times  · Curl up:  Lie on your back with your knees bent and feet flat on the floor  Place your hands, palms down, underneath the curve in your lower back  Next, with your elbows on the floor, lift your shoulders and chest 2 to 3 inches  Keep your head in line with your shoulders  Hold this position for 5 seconds  When you can do this exercise without pain for 10 to 15 seconds, you may add a rotation   While your shoulders and chest are lifted off the ground, turn slightly to the left and hold  Repeat on the other side  · Bird dog:  Place your hands and knees on the floor  Keep your wrists directly below your shoulders and your knees directly below your hips  Pull your belly button in toward your spine  Do not flatten or arch your back  Tighten your abdominal muscles  Raise one arm straight out so that it is aligned with your head  Next, raise the leg opposite your arm  Hold this position for 15 seconds  Lower your arm and leg slowly and change sides  Do 5 sets  © Copyright [employer] 2022 Information is for End User's use only and may not be sold, redistributed or otherwise used for commercial purposes  All illustrations and images included in CareNotes® are the copyrighted property of [employer] Inc  or Lara Baldwin  The above information is an  only  It is not intended as medical advice for individual conditions or treatments  Talk to your doctor, nurse or pharmacist before following any medical regimen to see if it is safe and effective for you  Sciatica   WHAT YOU NEED TO KNOW:   Sciatica is a condition that causes pain along your sciatic nerve  The sciatic nerve runs from your spine through both sides of your buttocks  It then runs down the back of your thigh, into your lower leg and foot  Your sciatic nerve may be compressed, inflamed, irritated, or stretched  DISCHARGE INSTRUCTIONS:   Medicines:   · NSAIDs:  These medicines decrease swelling and pain  NSAIDs are available without a doctor's order  Ask your healthcare provider which medicine is right for you  Ask how much to take and when to take it  Take as directed  NSAIDs can cause stomach bleeding or kidney problems if not taken correctly  · Acetaminophen: This medicine decreases pain  Acetaminophen is available without a doctor's order  Ask how much to take and when to take it  Follow directions   Acetaminophen can cause liver damage if not taken correctly  · Muscle relaxers  help decrease pain and muscle spasms  · Take your medicine as directed  Contact your healthcare provider if you think your medicine is not helping or if you have side effects  Tell him of her if you are allergic to any medicine  Keep a list of the medicines, vitamins, and herbs you take  Include the amounts, and when and why you take them  Bring the list or the pill bottles to follow-up visits  Carry your medicine list with you in case of an emergency  Follow up with your doctor as directed:  Write down your questions so you remember to ask them during your visits  Manage your symptoms:   · Activity:  Decrease your activity  Do not lift heavy objects or twist your back for at least 6 weeks  Slowly return to your usual activity  · Ice:  Ice helps decrease swelling and pain  Ice may also help prevent tissue damage  Use an ice pack, or put crushed ice in a plastic bag  Cover it with a towel and place it on your low back or leg for 15 to 20 minutes every hour or as directed  · Heat:  Heat helps decrease pain and muscle spasms  Apply heat on the area for 20 to 30 minutes every 2 hours for as many days as directed  · Physical therapy:  You may need to see physical therapist to teach you exercises to help improve movement and strength, and to decrease pain  An occupational therapist teaches you skills to help with your daily activities  · Use assistive devices if directed: You may need to wear back support, such as a back brace  You may need crutches, a cane, or a walker to decrease stress on your lower back and leg muscles  Ask your healthcare provider for more information about assistive devices and how to use them correctly  Self-care:   · Avoid pressure on your back and legs:  Do not  lift heavy objects, or stand or sit for long periods of time      · Lift objects safely:  Keep your back straight and bend your knees when you  an object  Do not bend or twist your back when you lift  · Maintain a healthy weight:  Ask your healthcare provider how much you should weigh  Ask him to help you create a weight loss plan if you are overweight  · Exercise:  Ask your healthcare provider about the best stretching, warmup, and exercise plan for you  Contact your healthcare provider if:   · You have pain in your lower back at night or when resting  · You have pain in your lower back with numbness below the knee  · You have weakness in one leg only  · You have questions or concerns about your condition or care  Return to the emergency department if:   · You have trouble holding back your urine or bowel movements  · You have weakness in both legs  · You have numbness in your groin or buttocks  © Copyright [employer] 2022 Information is for End User's use only and may not be sold, redistributed or otherwise used for commercial purposes  All illustrations and images included in CareNotes® are the copyrighted property of A D A M , Inc  or [facility] Brisa Bullock   The above information is an  only  It is not intended as medical advice for individual conditions or treatments  Talk to your doctor, nurse or pharmacist before following any medical regimen to see if it is safe and effective for you

## 2022-03-08 ENCOUNTER — TELEPHONE (OUTPATIENT)
Dept: PHYSICAL THERAPY | Facility: OTHER | Age: 67
End: 2022-03-08

## 2022-03-08 NOTE — TELEPHONE ENCOUNTER
Call placed to the patient per [facility] referral     Identified myself and patient stated "no thank you" and disconnected the call  Referral Closed

## 2022-06-01 DIAGNOSIS — F51.01 PRIMARY INSOMNIA: ICD-10-CM

## 2022-06-01 RX ORDER — ZOLPIDEM TARTRATE 10 MG/1
10 TABLET ORAL
Qty: 30 TABLET | Refills: 0 | Status: CANCELLED | OUTPATIENT
Start: 2022-06-01

## 2022-06-15 ENCOUNTER — OFFICE VISIT (OUTPATIENT)
Dept: URGENT CARE | Facility: CLINIC | Age: 67
End: 2022-06-15
Payer: COMMERCIAL

## 2022-06-15 VITALS — HEART RATE: 74 BPM | TEMPERATURE: 97.9 F | OXYGEN SATURATION: 99 % | RESPIRATION RATE: 16 BRPM

## 2022-06-15 DIAGNOSIS — J06.9 VIRAL URI: Primary | ICD-10-CM

## 2022-06-15 PROCEDURE — G0382 LEV 3 HOSP TYPE B ED VISIT: HCPCS | Performed by: FAMILY MEDICINE

## 2022-06-15 RX ORDER — LORATADINE 10 MG/1
10 TABLET ORAL DAILY
Qty: 30 TABLET | Refills: 0 | Status: SHIPPED | OUTPATIENT
Start: 2022-06-15

## 2022-06-15 RX ORDER — METHYLPREDNISOLONE 4 MG/1
TABLET ORAL
Qty: 21 TABLET | Refills: 0 | Status: SHIPPED | OUTPATIENT
Start: 2022-06-15

## 2022-06-15 RX ORDER — FLUTICASONE PROPIONATE 50 MCG
1 SPRAY, SUSPENSION (ML) NASAL DAILY
Qty: 11.1 ML | Refills: 0 | Status: SHIPPED | OUTPATIENT
Start: 2022-06-15

## 2022-06-15 NOTE — PROGRESS NOTES
[facility]        NAME: Anival Atkinson is a 74 y o  female  : 1955    MRN: [id number]  DATE: Delilah 15, 2022  TIME: 8:39 AM    Assessment and Plan   Viral URI [J06 9]  1  Viral URI  methylPREDNISolone 4 MG tablet therapy pack    fluticasone (FLONASE) 50 mcg/act nasal spray    loratadine (CLARITIN) 10 mg tablet         Patient Instructions     Decongestants given for congestion  No signs of bacterial infection today  Follow up with PCP in 3-5 days if no improvement  Proceed to ER if symptoms worsen  Chief Complaint     Chief Complaint   Patient presents with    Cold Like Symptoms     Started with h/a one week ago now having congestion, occ sore throat, Occ L ear pain         History of Present Illness     Anival Atkinson is a 74 y o  female presenting to the office today for upper respiratory complaints  Symptoms have been present for 7 days, and include congestion and facial pressure  She has tried OTC decongestants and Tylenol for her symptoms, with some relief  Sick contacts include: None      Review of Systems     Review of Systems   Constitutional: Negative for chills, fatigue and fever  HENT: Positive for congestion and sinus pressure  Negative for ear discharge, ear pain, postnasal drip, sinus pain and sore throat  Eyes: Negative for pain and discharge  Respiratory: Negative for cough and shortness of breath  Cardiovascular: Negative for chest pain and palpitations  Gastrointestinal: Negative for abdominal pain, diarrhea, nausea and vomiting  Genitourinary: Negative for difficulty urinating and dysuria  Musculoskeletal: Negative for arthralgias and myalgias  Skin: Negative for rash  Neurological: Negative for dizziness, syncope, light-headedness, numbness and headaches  Psychiatric/Behavioral: Negative for agitation  All other systems reviewed and are negative        Current Medications       Current Outpatient Medications:     diltiazem (CARDIZEM) 60 mg tablet, Take 1 tablet (60 mg total) by mouth 3 (three) times a day replacing extended release formulation, Disp: 270 tablet, Rfl: 0    fluticasone (FLONASE) 50 mcg/act nasal spray, 1 spray into each nostril daily, Disp: 11 1 mL, Rfl: 0    loratadine (CLARITIN) 10 mg tablet, Take 1 tablet (10 mg total) by mouth daily, Disp: 30 tablet, Rfl: 0    methylPREDNISolone 4 MG tablet therapy pack, Use as directed on package, Disp: 21 tablet, Rfl: 0    montelukast (SINGULAIR) 10 mg tablet, Take 1 tablet (10 mg total) by mouth daily at bedtime, Disp: 90 tablet, Rfl: 3    multivitamin (THERAGRAN) TABS, Take 1 tablet by mouth daily, Disp: , Rfl:     neomycin-polymyxin-dexamethasone (MAXITROL) ophthalmic suspension, , Disp: , Rfl:     zolpidem (AMBIEN) 10 mg tablet, Take 1 tablet (10 mg total) by mouth daily at bedtime as needed for sleep, Disp: 90 tablet, Rfl: 1    Current Allergies     Allergies as of 06/15/2022    (No Known Allergies)            The following portions of the patient's history were reviewed and updated as appropriate: allergies, current medications, past family history, past medical history, past social history, past surgical history and problem list      Past Medical History:   Diagnosis Date    Cortical age-related cataract of both eyes 8/6/2020    Ovarian cyst     Right    Raynaud phenomenon        Past Surgical History:   Procedure Laterality Date    CAUTERIZE INNER NOSE      REPLACEMENT TOTAL KNEE Bilateral     2012/2013       Family History   Problem Relation Age of Onset    Other Brother         Brain tumor     Diabetes Mother     Osteoarthritis Mother     Diabetes Father     Aneurysm Father     Other Brother     Multiple myeloma Brother     No Known Problems Paternal Aunt        Medications have been verified  Objective     Pulse 74   Temp 97 9 °F (36 6 °C) (Temporal)   Resp 16   SpO2 99%   No LMP recorded   Patient is postmenopausal      Physical Exam     Physical Exam  Vitals reviewed  Constitutional:       General: She is not in acute distress  Appearance: Normal appearance  She is not ill-appearing  HENT:      Head: Normocephalic and atraumatic  Right Ear: Tympanic membrane and ear canal normal  No middle ear effusion  Left Ear: Tympanic membrane and ear canal normal   No middle ear effusion  Nose:      Right Sinus: No maxillary sinus tenderness or frontal sinus tenderness  Left Sinus: No maxillary sinus tenderness or frontal sinus tenderness  Comments: Left sided maxillary pressure - no pain  Mouth/Throat:      Mouth: Mucous membranes are moist       Pharynx: No oropharyngeal exudate or posterior oropharyngeal erythema  Tonsils: No tonsillar exudate  Eyes:      Extraocular Movements: Extraocular movements intact  Conjunctiva/sclera: Conjunctivae normal       Pupils: Pupils are equal, round, and reactive to light  Cardiovascular:      Rate and Rhythm: Normal rate and regular rhythm  Pulses: Normal pulses  Heart sounds: Normal heart sounds  No murmur heard  Pulmonary:      Effort: Pulmonary effort is normal  No respiratory distress  Breath sounds: Normal breath sounds  No wheezing  Musculoskeletal:      Cervical back: Normal range of motion and neck supple  No tenderness  Lymphadenopathy:      Cervical: No cervical adenopathy  Skin:     General: Skin is warm  Neurological:      General: No focal deficit present  Mental Status: She is alert     Psychiatric:         Mood and Affect: Mood normal          Behavior: Behavior normal          Judgment: Judgment normal

## 2022-07-06 LAB
1,25(OH)2D SERPL-MCNC: 37 PG/ML (ref 18–72)
1,25(OH)2D2 SERPL-MCNC: <8 PG/ML
1,25(OH)2D3 SERPL-MCNC: 37 PG/ML
ALBUMIN SERPL-MCNC: 3.8 G/DL (ref 3.6–5.1)
ALBUMIN/GLOB SERPL: 1.7 (CALC) (ref 1–2.5)
ALP SERPL-CCNC: 87 U/L (ref 37–153)
ALT SERPL-CCNC: 18 U/L (ref 6–29)
AST SERPL-CCNC: 17 U/L (ref 10–35)
BASOPHILS # BLD AUTO: 58 CELLS/UL (ref 0–200)
BASOPHILS NFR BLD AUTO: 1 %
BILIRUB SERPL-MCNC: 0.6 MG/DL (ref 0.2–1.2)
BUN SERPL-MCNC: 23 MG/DL (ref 7–25)
BUN/CREAT SERPL: NORMAL (CALC) (ref 6–22)
CALCIUM SERPL-MCNC: 9.2 MG/DL (ref 8.6–10.4)
CHLORIDE SERPL-SCNC: 104 MMOL/L (ref 98–110)
CHOLEST SERPL-MCNC: 203 MG/DL
CHOLEST/HDLC SERPL: 2.4 (CALC)
CO2 SERPL-SCNC: 29 MMOL/L (ref 20–32)
CREAT SERPL-MCNC: 0.79 MG/DL (ref 0.5–0.99)
EOSINOPHIL # BLD AUTO: 139 CELLS/UL (ref 15–500)
EOSINOPHIL NFR BLD AUTO: 2.4 %
ERYTHROCYTE [DISTWIDTH] IN BLOOD BY AUTOMATED COUNT: 15.6 % (ref 11–15)
GLOBULIN SER CALC-MCNC: 2.3 G/DL (CALC) (ref 1.9–3.7)
GLUCOSE SERPL-MCNC: 89 MG/DL (ref 65–99)
HCT VFR BLD AUTO: 35.6 % (ref 35–45)
HDLC SERPL-MCNC: 83 MG/DL
HGB BLD-MCNC: 10.7 G/DL (ref 11.7–15.5)
LDLC SERPL CALC-MCNC: 100 MG/DL (CALC)
LYMPHOCYTES # BLD AUTO: 2169 CELLS/UL (ref 850–3900)
LYMPHOCYTES NFR BLD AUTO: 37.4 %
MCH RBC QN AUTO: 23.9 PG (ref 27–33)
MCHC RBC AUTO-ENTMCNC: 30.1 G/DL (ref 32–36)
MCV RBC AUTO: 79.5 FL (ref 80–100)
MONOCYTES # BLD AUTO: 621 CELLS/UL (ref 200–950)
MONOCYTES NFR BLD AUTO: 10.7 %
NEUTROPHILS # BLD AUTO: 2813 CELLS/UL (ref 1500–7800)
NEUTROPHILS NFR BLD AUTO: 48.5 %
NONHDLC SERPL-MCNC: 120 MG/DL (CALC)
PLATELET # BLD AUTO: 222 THOUSAND/UL (ref 140–400)
PMV BLD REES-ECKER: 12.1 FL (ref 7.5–12.5)
POTASSIUM SERPL-SCNC: 4.7 MMOL/L (ref 3.5–5.3)
PROT SERPL-MCNC: 6.1 G/DL (ref 6.1–8.1)
RBC # BLD AUTO: 4.48 MILLION/UL (ref 3.8–5.1)
SL AMB EGFR AFRICAN AMERICAN: 91 ML/MIN/1.73M2
SL AMB EGFR NON AFRICAN AMERICAN: 79 ML/MIN/1.73M2
SODIUM SERPL-SCNC: 139 MMOL/L (ref 135–146)
TRIGL SERPL-MCNC: 107 MG/DL
TSH SERPL-ACNC: 2.61 MIU/L (ref 0.4–4.5)
WBC # BLD AUTO: 5.8 THOUSAND/UL (ref 3.8–10.8)

## 2022-07-21 ENCOUNTER — OFFICE VISIT (OUTPATIENT)
Dept: FAMILY MEDICINE CLINIC | Facility: CLINIC | Age: 67
End: 2022-07-21
Payer: COMMERCIAL

## 2022-07-21 VITALS
BODY MASS INDEX: 35.2 KG/M2 | HEART RATE: 73 BPM | SYSTOLIC BLOOD PRESSURE: 125 MMHG | HEIGHT: 66 IN | DIASTOLIC BLOOD PRESSURE: 80 MMHG | OXYGEN SATURATION: 97 % | WEIGHT: 219 LBS

## 2022-07-21 DIAGNOSIS — D50.9 HYPOCHROMIC MICROCYTIC ANEMIA: Primary | ICD-10-CM

## 2022-07-21 DIAGNOSIS — F51.01 PRIMARY INSOMNIA: ICD-10-CM

## 2022-07-21 DIAGNOSIS — Z01.419 ENCOUNTER FOR ANNUAL ROUTINE GYNECOLOGICAL EXAMINATION: ICD-10-CM

## 2022-07-21 DIAGNOSIS — Z01.419 WELL FEMALE EXAM WITH ROUTINE GYNECOLOGICAL EXAM: ICD-10-CM

## 2022-07-21 DIAGNOSIS — E55.9 VITAMIN D DEFICIENCY: ICD-10-CM

## 2022-07-21 DIAGNOSIS — I73.00 RAYNAUD'S DISEASE WITHOUT GANGRENE: ICD-10-CM

## 2022-07-21 DIAGNOSIS — Z23 NEED FOR PROPHYLACTIC VACCINATION AGAINST STREPTOCOCCUS PNEUMONIAE (PNEUMOCOCCUS): ICD-10-CM

## 2022-07-21 PROCEDURE — 99215 OFFICE O/P EST HI 40 MIN: CPT | Performed by: FAMILY MEDICINE

## 2022-07-21 PROCEDURE — 90677 PCV20 VACCINE IM: CPT | Performed by: FAMILY MEDICINE

## 2022-07-21 PROCEDURE — 90471 IMMUNIZATION ADMIN: CPT | Performed by: FAMILY MEDICINE

## 2022-07-21 PROCEDURE — 0503F POSTPARTUM CARE VISIT: CPT | Performed by: FAMILY MEDICINE

## 2022-07-21 NOTE — PROGRESS NOTES
Subjective:      Patient ID: Nick Joseph is a 74 y o  female  53-year-old female presents for follow-up and review of labs  Patient is not taking calcium channel blocker for Raynaud's phenomenon  She wears heavy socks or gloves  Overall has been feeling well  Continues to have chronic insomnia for which she does take Ambien on a nightly basis which does help with her insomnia  Labs reviewed which showed normal CMP, cholesterol  Hemoglobin has gradually been trending down  Patient is postmenopausal   She is current with colon cancer screening  Admittedly she does not really eat any red meat whatsoever  She states that when she was younger she did have issues with anemia  Patient does have evidence of hypochromic, microcytic anemia with hemoglobin at 10 9  TSH 2 6, total cholesterol 203 with LDL of 100      Past Medical History:   Diagnosis Date    Cortical age-related cataract of both eyes 8/6/2020    Ovarian cyst     Right    Raynaud phenomenon        Family History   Problem Relation Age of Onset    Other Brother         Brain tumor     Diabetes Mother     Osteoarthritis Mother     Diabetes Father     Aneurysm Father     Other Brother     Multiple myeloma Brother     No Known Problems Paternal Aunt        Past Surgical History:   Procedure Laterality Date    CAUTERIZE INNER NOSE      REPLACEMENT TOTAL KNEE Bilateral     2012/2013        reports that she has never smoked  She has never used smokeless tobacco  She reports current alcohol use  She reports that she does not use drugs        Current Outpatient Medications:     fluticasone (FLONASE) 50 mcg/act nasal spray, 1 spray into each nostril daily, Disp: 11 1 mL, Rfl: 0    loratadine (CLARITIN) 10 mg tablet, Take 1 tablet (10 mg total) by mouth daily, Disp: 30 tablet, Rfl: 0    montelukast (SINGULAIR) 10 mg tablet, Take 1 tablet (10 mg total) by mouth daily at bedtime, Disp: 90 tablet, Rfl: 3    multivitamin (THERAGRAN) TABS, Take 1 tablet by mouth daily, Disp: , Rfl:     zolpidem (AMBIEN) 10 mg tablet, Take 1 tablet (10 mg total) by mouth daily at bedtime as needed for sleep, Disp: 90 tablet, Rfl: 1    methylPREDNISolone 4 MG tablet therapy pack, Use as directed on package (Patient not taking: Reported on 7/21/2022), Disp: 21 tablet, Rfl: 0    neomycin-polymyxin-dexamethasone (MAXITROL) ophthalmic suspension, , Disp: , Rfl:     The following portions of the patient's history were reviewed and updated as appropriate: allergies, current medications, past family history, past medical history, past social history, past surgical history and problem list     Review of Systems   Constitutional: Negative  Negative for unexpected weight change  HENT: Negative  Eyes: Negative  Respiratory: Negative  Negative for shortness of breath  Cardiovascular: Negative  Negative for leg swelling  Gastrointestinal: Negative  Endocrine: Negative  Genitourinary: Negative  Musculoskeletal: Negative  Skin: Negative  Allergic/Immunologic: Negative  Neurological: Negative  Hematological: Negative  Psychiatric/Behavioral: Negative  Objective:    /80   Pulse 73   Ht 5' 6" (1 676 m)   Wt 99 3 kg (219 lb)   SpO2 97%   BMI 35 35 kg/m²      Physical Exam  Vitals and nursing note reviewed  Constitutional:       General: She is not in acute distress  Appearance: Normal appearance  She is well-developed  She is obese  She is not diaphoretic  HENT:      Head: Normocephalic and atraumatic  Right Ear: Tympanic membrane, ear canal and external ear normal       Left Ear: Tympanic membrane, ear canal and external ear normal       Nose: Nose normal    Eyes:      Conjunctiva/sclera: Conjunctivae normal       Pupils: Pupils are equal, round, and reactive to light  Cardiovascular:      Rate and Rhythm: Normal rate and regular rhythm  Heart sounds: Normal heart sounds  No murmur heard    Pulmonary: Effort: Pulmonary effort is normal       Breath sounds: Normal breath sounds  Abdominal:      General: Abdomen is flat  Bowel sounds are normal       Palpations: Abdomen is soft  Musculoskeletal:         General: Normal range of motion  Cervical back: Normal range of motion and neck supple  Skin:     General: Skin is warm and dry  Findings: No rash  Neurological:      General: No focal deficit present  Mental Status: She is alert and oriented to person, place, and time  Mental status is at baseline  Deep Tendon Reflexes: Reflexes are normal and symmetric  Psychiatric:         Mood and Affect: Mood normal          Behavior: Behavior normal          Thought Content:  Thought content normal          Judgment: Judgment normal            Recent Results (from the past 1008 hour(s))   Lipid panel    Collection Time: 07/01/22  6:51 AM   Result Value Ref Range    Total Cholesterol 203 (H) <200 mg/dL    HDL 83 > OR = 50 mg/dL    Triglycerides 107 <150 mg/dL    LDL Calculated 100 (H) mg/dL (calc)    Chol HDLC Ratio 2 4 <5 0 (calc)    Non-HDL Cholesterol 120 <130 mg/dL (calc)   Comprehensive metabolic panel    Collection Time: 07/01/22  6:51 AM   Result Value Ref Range    Glucose, Random 89 65 - 99 mg/dL    BUN 23 7 - 25 mg/dL    Creatinine 0 79 0 50 - 0 99 mg/dL    eGFR Non  79 > OR = 60 mL/min/1 73m2    eGFR  91 > OR = 60 mL/min/1 73m2    SL AMB BUN/CREATININE RATIO NOT APPLICABLE 6 - 22 (calc)    Sodium 139 135 - 146 mmol/L    Potassium 4 7 3 5 - 5 3 mmol/L    Chloride 104 98 - 110 mmol/L    CO2 29 20 - 32 mmol/L    Calcium 9 2 8 6 - 10 4 mg/dL    Protein, Total 6 1 6 1 - 8 1 g/dL    Albumin 3 8 3 6 - 5 1 g/dL    Globulin 2 3 1 9 - 3 7 g/dL (calc)    Albumin/Globulin Ratio 1 7 1 0 - 2 5 (calc)    TOTAL BILIRUBIN 0 6 0 2 - 1 2 mg/dL    Alkaline Phosphatase 87 37 - 153 U/L    AST 17 10 - 35 U/L    ALT 18 6 - 29 U/L   Vitamin D 1,25 dihydroxy    Collection Time: 07/01/22  6:51 AM   Result Value Ref Range    Total 1,25-Dihydroxy,Vitamin D 37 18 - 72 pg/mL    Vit D, 1,25-Dihydroxy 37 pg/mL    1,25-Dihydroxy, Vitamin D-2 <8 pg/mL   CBC and differential    Collection Time: 07/01/22  6:51 AM   Result Value Ref Range    White Blood Cell Count 5 8 3 8 - 10 8 Thousand/uL    Red Blood Cell Count 4 48 3 80 - 5 10 Million/uL    Hemoglobin 10 7 (L) 11 7 - 15 5 g/dL    HCT 35 6 35 0 - 45 0 %    MCV 79 5 (L) 80 0 - 100 0 fL    MCH 23 9 (L) 27 0 - 33 0 pg    MCHC 30 1 (L) 32 0 - 36 0 g/dL    RDW 15 6 (H) 11 0 - 15 0 %    Platelet Count 893 720 - 400 Thousand/uL    SL AMB MPV 12 1 7 5 - 12 5 fL    Neutrophils (Absolute) 2,813 1,500 - 7,800 cells/uL    Lymphocytes (Absolute) 2,169 850 - 3,900 cells/uL    Monocytes (Absolute) 621 200 - 950 cells/uL    Eosinophils (Absolute) 139 15 - 500 cells/uL    Basophils ABS 58 0 - 200 cells/uL    Neutrophils 48 5 %    Lymphocytes 37 4 %    Monocytes 10 7 %    Eosinophils 2 4 %    Basophils PCT 1 0 %   TSH, 3rd generation with Free T4 reflex    Collection Time: 07/01/22  6:51 AM   Result Value Ref Range    TSH W/RFX TO FREE T4 2 61 0 40 - 4 50 mIU/L       Assessment/Plan:    Raynaud's disease without gangrene  Patient is not taking calcium channel blocker  She chooses to wear gloves or heavy socks during cold weather    Well female exam with routine gynecological exam  Overdue for repeat Pap smear  Generated order for 77 [facility] gynecologist    Vitamin D deficiency  Remains on over-the-counter vitamin-D supplementation  No change  Primary insomnia  Does well with p r n  Ambien  Does not presently need refills  Will contact the office 1 refills necessary    Hypochromic microcytic anemia  Likely iron deficiency anemia because the patient does not consume red meat  She is current with screening colonoscopy  Denies any evidence of melena or blood in stool  Check iron studies    If low then will place on iron supplementation and repeat hemoglobin and iron studies          Problem List Items Addressed This Visit        Cardiovascular and Mediastinum    Raynaud's disease without gangrene     Patient is not taking calcium channel blocker  She chooses to wear gloves or heavy socks during cold weather            Other    Encounter for annual routine gynecological examination    Relevant Orders    Ambulatory Referral to Gynecology    Hypochromic microcytic anemia - Primary     Likely iron deficiency anemia because the patient does not consume red meat  She is current with screening colonoscopy  Denies any evidence of melena or blood in stool  Check iron studies  If low then will place on iron supplementation and repeat hemoglobin and iron studies         Relevant Orders    Iron Panel (Includes Ferritin, Iron Sat%, Iron, and TIBC)    Primary insomnia     Does well with p r n  Ambien  Does not presently need refills  Will contact the office 1 refills necessary         Vitamin D deficiency     Remains on over-the-counter vitamin-D supplementation  No change  Well female exam with routine gynecological exam     Overdue for repeat Pap smear    Generated order for [facility] gynecologist           Other Visit Diagnoses     Need for prophylactic vaccination against Streptococcus pneumoniae (pneumococcus)        Relevant Orders    Pneumococcal Conjugate Vaccine 20-valent (Pcv20) (Completed)

## 2022-07-22 NOTE — ASSESSMENT & PLAN NOTE
Patient is not taking calcium channel blocker    She chooses to wear gloves or heavy socks during cold weather

## 2022-07-22 NOTE — ASSESSMENT & PLAN NOTE
Likely iron deficiency anemia because the patient does not consume red meat  She is current with screening colonoscopy  Denies any evidence of melena or blood in stool  Check iron studies    If low then will place on iron supplementation and repeat hemoglobin and iron studies

## 2022-07-22 NOTE — ASSESSMENT & PLAN NOTE
Overdue for repeat Pap smear    Generated order for [facility] gynecologist

## 2022-07-22 NOTE — ASSESSMENT & PLAN NOTE
Does well with p r n  Ambien  Does not presently need refills    Will contact the office 1 refills necessary

## 2022-07-24 LAB
FERRITIN SERPL-MCNC: 14 NG/ML (ref 16–288)
IRON SATN MFR SERPL: 15 % (CALC) (ref 16–45)
IRON SERPL-MCNC: 57 MCG/DL (ref 45–160)
TIBC SERPL-MCNC: 377 MCG/DL (CALC) (ref 250–450)

## 2022-07-25 DIAGNOSIS — D50.9 HYPOCHROMIC MICROCYTIC ANEMIA: Primary | ICD-10-CM

## 2022-07-25 RX ORDER — FERROUS SULFATE TAB EC 324 MG (65 MG FE EQUIVALENT) 324 (65 FE) MG
324 TABLET DELAYED RESPONSE ORAL
Qty: 90 TABLET | Refills: 1 | Status: SHIPPED | OUTPATIENT
Start: 2022-07-25 | End: 2022-09-29

## 2022-07-25 NOTE — RESULT ENCOUNTER NOTE
Please call patient and notify that results show that she is considerably iron deficient  Ferritin level is extremely low  Sending over a prescription for iron sulfate 324 mg    If not covered by her insurance that she can simply  over-the-counter and will repeat blood work in 2 months to ensure that her iron levels have improved and her anemia has resolved

## 2022-09-07 ENCOUNTER — OFFICE VISIT (OUTPATIENT)
Dept: OBGYN CLINIC | Facility: CLINIC | Age: 67
End: 2022-09-07
Payer: COMMERCIAL

## 2022-09-07 VITALS
WEIGHT: 224 LBS | DIASTOLIC BLOOD PRESSURE: 74 MMHG | HEIGHT: 66 IN | SYSTOLIC BLOOD PRESSURE: 124 MMHG | BODY MASS INDEX: 36 KG/M2

## 2022-09-07 DIAGNOSIS — Z01.419 WOMEN'S ANNUAL ROUTINE GYNECOLOGICAL EXAMINATION: Primary | ICD-10-CM

## 2022-09-07 DIAGNOSIS — Z78.0 ASYMPTOMATIC MENOPAUSAL STATE: ICD-10-CM

## 2022-09-07 DIAGNOSIS — Z12.4 ENCOUNTER FOR SCREENING FOR MALIGNANT NEOPLASM OF CERVIX: ICD-10-CM

## 2022-09-07 DIAGNOSIS — Z01.419 ENCOUNTER FOR ANNUAL ROUTINE GYNECOLOGICAL EXAMINATION: ICD-10-CM

## 2022-09-07 DIAGNOSIS — Z12.31 ENCOUNTER FOR SCREENING MAMMOGRAM FOR MALIGNANT NEOPLASM OF BREAST: ICD-10-CM

## 2022-09-07 PROCEDURE — 99397 PER PM REEVAL EST PAT 65+ YR: CPT | Performed by: OBSTETRICS & GYNECOLOGY

## 2022-09-07 PROCEDURE — G0145 SCR C/V CYTO,THINLAYER,RESCR: HCPCS | Performed by: OBSTETRICS & GYNECOLOGY

## 2022-09-07 PROCEDURE — G0476 HPV COMBO ASSAY CA SCREEN: HCPCS | Performed by: OBSTETRICS & GYNECOLOGY

## 2022-09-07 PROCEDURE — 0503F POSTPARTUM CARE VISIT: CPT | Performed by: OBSTETRICS & GYNECOLOGY

## 2022-09-07 NOTE — PROGRESS NOTES
ASSESSMENT & PLAN:   Diagnoses and all orders for this visit:    Women's annual routine gynecological examination  -     Liquid-based pap, screening    Encounter for screening for malignant neoplasm of cervix  -     Liquid-based pap, screening    Encounter for annual routine gynecological examination  -     Ambulatory Referral to Gynecology  -     Liquid-based pap, screening    Encounter for screening mammogram for malignant neoplasm of breast  -     Mammo screening bilateral w 3d & cad; Future    Asymptomatic menopausal state  -     DXA bone density spine hip and pelvis; Future          The following were reviewed in today's visit: ASCCP guidelines (Pap screen not indicated after age 72), STD testing breast self exam, mammography screening ordered, menopause, osteoporosis, exercise, healthy diet and DEXA ordered  Patient to return to office in yearly for annual exam      All questions have been answered to her satisfaction  CC:  Annual Gynecologic Examination  Chief Complaint   Patient presents with    Gynecologic Exam     Pt is here for her annual exam  Last pap was 3/7/19 NILM, HPV negative and no more paps are indicated  Most recent mammogram was 2021 and new script was printed  HPI: Bobbi Jimenez is a 74 y o  Gigi Gallego who presents for annual gynecologic examination  She has the following concerns:  none      Health Maintenance:    Exercise: several times per week  Breast exams/breast awareness: yes  Diet: well balanced diet  Last mammogram:   Colorectal cancer screenin, due   DEXA: , ordered today     Past Medical History:   Diagnosis Date    Anemia     Cortical age-related cataract of both eyes 2020    Ovarian cyst     Right    Raynaud phenomenon        Past Surgical History:   Procedure Laterality Date    CAUTERIZE INNER NOSE      REPLACEMENT TOTAL KNEE Bilateral            Past OB/Gyn History:   No LMP recorded   Patient is postmenopausal     Patient is menopausal    Menopausal symptoms: None  Last Pap: 2019 : no abnormalities; further pap screening not indicated  History of abnormal Pap smear: no    Patient is not currently sexually active       Family History  Family History   Problem Relation Age of Onset    Other Brother         Brain tumor     Diabetes Mother     Osteoarthritis Mother     Diabetes Father     Aneurysm Father     Other Brother     Multiple myeloma Brother     No Known Problems Paternal Aunt        Family history of uterine or ovarian cancer: no  Family history of breast cancer: no  Family history of colon cancer: no    Social History:  Social History     Socioeconomic History    Marital status: /Civil Union     Spouse name: Not on file    Number of children: Not on file    Years of education: Not on file    Highest education level: Not on file   Occupational History    Not on file   Tobacco Use    Smoking status: Never Smoker    Smokeless tobacco: Never Used   Vaping Use    Vaping Use: Never used   Substance and Sexual Activity    Alcohol use: Yes     Comment: occasional    Drug use: No    Sexual activity: Not Currently     Partners: Male     Birth control/protection: None   Other Topics Concern    Not on file   Social History Narrative     , lives with      Social Determinants of Health     Financial Resource Strain: Not on file   Food Insecurity: Not on file   Transportation Needs: Not on file   Physical Activity: Not on file   Stress: Not on file   Social Connections: Not on file   Intimate Partner Violence: Not on file   Housing Stability: Not on file     Domestic violence screen: negative    Allergies:  No Known Allergies    Medications:    Current Outpatient Medications:     ferrous sulfate 324 (65 Fe) mg, Take 1 tablet (324 mg total) by mouth 2 (two) times a day before meals, Disp: 90 tablet, Rfl: 1    fluticasone (FLONASE) 50 mcg/act nasal spray, 1 spray into each nostril daily, Disp: 11 1 mL, Rfl: 0    loratadine (CLARITIN) 10 mg tablet, Take 1 tablet (10 mg total) by mouth daily, Disp: 30 tablet, Rfl: 0    montelukast (SINGULAIR) 10 mg tablet, Take 1 tablet (10 mg total) by mouth daily at bedtime, Disp: 90 tablet, Rfl: 3    multivitamin (THERAGRAN) TABS, Take 1 tablet by mouth daily, Disp: , Rfl:     zolpidem (AMBIEN) 10 mg tablet, Take 1 tablet (10 mg total) by mouth daily at bedtime as needed for sleep, Disp: 90 tablet, Rfl: 1    methylPREDNISolone 4 MG tablet therapy pack, Use as directed on package (Patient not taking: No sig reported), Disp: 21 tablet, Rfl: 0    neomycin-polymyxin-dexamethasone (MAXITROL) ophthalmic suspension, , Disp: , Rfl:     Review of Systems:  Review of Systems   Constitutional: Negative for chills and fever  Respiratory: Negative for cough and shortness of breath  Cardiovascular: Negative for chest pain and palpitations  Gastrointestinal: Negative for abdominal distention, abdominal pain, blood in stool, constipation, nausea and vomiting  Genitourinary: Negative for difficulty urinating, dysuria, frequency, pelvic pain, urgency, vaginal bleeding, vaginal discharge and vaginal pain  Neurological: Negative for headaches  Physical Exam:  /74 (BP Location: Right arm, Patient Position: Sitting, Cuff Size: Large)   Ht 5' 6" (1 676 m)   Wt 102 kg (224 lb)   BMI 36 15 kg/m²    Physical Exam  Constitutional:       General: She is awake  Appearance: Normal appearance  She is well-developed  Genitourinary:      Vulva, bladder and urethral meatus normal       Right Labia: No rash, tenderness or lesions  Left Labia: No tenderness, lesions or rash  No labial fusion noted  No vaginal discharge, erythema, tenderness or bleeding  No vaginal prolapse present  Moderate vaginal atrophy present  Right Adnexa: not tender, not full and no mass present       Left Adnexa: not tender, not full and no mass present  Cervix is nulliparous  No cervical motion tenderness, discharge, lesion or polyp  Uterus is not enlarged, tender or irregular  No uterine mass detected  Uterus is anteverted  No urethral prolapse present  Bladder is not tender  Pelvic exam was performed with patient in the lithotomy position  Breasts:      Right: No inverted nipple, mass, nipple discharge, skin change, tenderness, axillary adenopathy or supraclavicular adenopathy  Left: No inverted nipple, mass, nipple discharge, skin change, tenderness, axillary adenopathy or supraclavicular adenopathy  HENT:      Head: Normocephalic and atraumatic  Cardiovascular:      Rate and Rhythm: Normal rate and regular rhythm  Heart sounds: Normal heart sounds  Pulmonary:      Effort: Pulmonary effort is normal  No tachypnea or respiratory distress  Breath sounds: Normal breath sounds  Abdominal:      General: Abdomen is flat  There is no distension  Palpations: Abdomen is soft  Tenderness: There is no abdominal tenderness  There is no guarding or rebound  Musculoskeletal:      Cervical back: Neck supple  Lymphadenopathy:      Upper Body:      Right upper body: No supraclavicular or axillary adenopathy  Left upper body: No supraclavicular or axillary adenopathy  Neurological:      General: No focal deficit present  Mental Status: She is alert and oriented to person, place, and time  Psychiatric:         Mood and Affect: Mood normal          Behavior: Behavior normal          Thought Content: Thought content normal          Judgment: Judgment normal    Vitals reviewed

## 2022-09-09 NOTE — RESULT ENCOUNTER NOTE
Adelina Rivera,     Your HPV testing is negative  Your pap is still pending, and will be updated soon  Please contact the office at [phone] with any questions       Pili

## 2022-09-14 LAB
LAB AP GYN PRIMARY INTERPRETATION: NORMAL
Lab: NORMAL

## 2022-09-26 LAB
BASOPHILS # BLD AUTO: 91 CELLS/UL (ref 0–200)
BASOPHILS NFR BLD AUTO: 1.6 %
EOSINOPHIL # BLD AUTO: 108 CELLS/UL (ref 15–500)
EOSINOPHIL NFR BLD AUTO: 1.9 %
ERYTHROCYTE [DISTWIDTH] IN BLOOD BY AUTOMATED COUNT: 18.1 % (ref 11–15)
HCT VFR BLD AUTO: 42.5 % (ref 35–45)
HGB BLD-MCNC: 13.6 G/DL (ref 11.7–15.5)
LYMPHOCYTES # BLD AUTO: 2155 CELLS/UL (ref 850–3900)
LYMPHOCYTES NFR BLD AUTO: 37.8 %
MCH RBC QN AUTO: 27.4 PG (ref 27–33)
MCHC RBC AUTO-ENTMCNC: 32 G/DL (ref 32–36)
MCV RBC AUTO: 85.7 FL (ref 80–100)
MONOCYTES # BLD AUTO: 576 CELLS/UL (ref 200–950)
MONOCYTES NFR BLD AUTO: 10.1 %
NEUTROPHILS # BLD AUTO: 2770 CELLS/UL (ref 1500–7800)
NEUTROPHILS NFR BLD AUTO: 48.6 %
PLATELET # BLD AUTO: 197 THOUSAND/UL (ref 140–400)
PMV BLD REES-ECKER: 11.7 FL (ref 7.5–12.5)
RBC # BLD AUTO: 4.96 MILLION/UL (ref 3.8–5.1)
WBC # BLD AUTO: 5.7 THOUSAND/UL (ref 3.8–10.8)

## 2022-09-29 ENCOUNTER — OFFICE VISIT (OUTPATIENT)
Dept: FAMILY MEDICINE CLINIC | Facility: CLINIC | Age: 67
End: 2022-09-29
Payer: COMMERCIAL

## 2022-09-29 VITALS
WEIGHT: 224 LBS | RESPIRATION RATE: 16 BRPM | OXYGEN SATURATION: 97 % | DIASTOLIC BLOOD PRESSURE: 76 MMHG | BODY MASS INDEX: 36 KG/M2 | HEIGHT: 66 IN | SYSTOLIC BLOOD PRESSURE: 132 MMHG | HEART RATE: 72 BPM

## 2022-09-29 DIAGNOSIS — D50.9 HYPOCHROMIC MICROCYTIC ANEMIA: ICD-10-CM

## 2022-09-29 DIAGNOSIS — Z00.00 ANNUAL PHYSICAL EXAM: ICD-10-CM

## 2022-09-29 DIAGNOSIS — F51.01 PRIMARY INSOMNIA: ICD-10-CM

## 2022-09-29 DIAGNOSIS — E55.9 VITAMIN D DEFICIENCY: Primary | ICD-10-CM

## 2022-09-29 PROCEDURE — 3288F FALL RISK ASSESSMENT DOCD: CPT | Performed by: FAMILY MEDICINE

## 2022-09-29 PROCEDURE — 99214 OFFICE O/P EST MOD 30 MIN: CPT | Performed by: FAMILY MEDICINE

## 2022-09-29 PROCEDURE — 1101F PT FALLS ASSESS-DOCD LE1/YR: CPT | Performed by: FAMILY MEDICINE

## 2022-09-29 PROCEDURE — 3725F SCREEN DEPRESSION PERFORMED: CPT | Performed by: FAMILY MEDICINE

## 2022-09-29 NOTE — ASSESSMENT & PLAN NOTE
Patient continues doing well with p r n  Ambien  Continue same    Contact the office 1 refills are necessary

## 2022-09-29 NOTE — ASSESSMENT & PLAN NOTE
Iron deficiency anemia  Patient is now eating red meat once a week  Recommended green leafy vegetables as well as beans as a good source of iron    Will discontinue iron supplementation and check repeat CBC and iron studies in approximately 3-4 months to see if she is able to maintain her iron levels and hemoglobin dietary early

## 2022-09-29 NOTE — PROGRESS NOTES
Subjective:      Patient ID: Jimenez Hernandez is a 74 y o  female  [de-identified] 46-year-old female presents for follow-up regarding iron deficiency anemia  Overall she has been feeling well  She has been taking iron supplementation twice daily and trying to eat red meat once a week  Normally she does not eat red meat but they have made adjustments to their diet  Labs reviewed which showed significant improvement in hemoglobin which increased up to 13 7 with normochromic normocytic indices where as she before she was anemic with hypochromic, microcytic indices  Overall she really does not feel any different but she has not had any nose bleeds or rectal bleeding  She is current on colonoscopy      Past Medical History:   Diagnosis Date    Anemia     Cortical age-related cataract of both eyes 08/06/2020    Ovarian cyst     Right    Raynaud phenomenon        Family History   Problem Relation Age of Onset    Other Brother         Brain tumor     Diabetes Mother     Osteoarthritis Mother     Diabetes Father     Aneurysm Father     Other Brother     Multiple myeloma Brother     No Known Problems Paternal Aunt        Past Surgical History:   Procedure Laterality Date    CAUTERIZE INNER NOSE      REPLACEMENT TOTAL KNEE Bilateral     2012/2013        reports that she has never smoked  She has never used smokeless tobacco  She reports current alcohol use  She reports that she does not use drugs        Current Outpatient Medications:     fluticasone (FLONASE) 50 mcg/act nasal spray, 1 spray into each nostril daily, Disp: 11 1 mL, Rfl: 0    loratadine (CLARITIN) 10 mg tablet, Take 1 tablet (10 mg total) by mouth daily, Disp: 30 tablet, Rfl: 0    montelukast (SINGULAIR) 10 mg tablet, Take 1 tablet (10 mg total) by mouth daily at bedtime, Disp: 90 tablet, Rfl: 3    multivitamin (THERAGRAN) TABS, Take 1 tablet by mouth daily, Disp: , Rfl:     neomycin-polymyxin-dexamethasone (MAXITROL) ophthalmic suspension, , Disp: , Rfl:     zolpidem (AMBIEN) 10 mg tablet, Take 1 tablet (10 mg total) by mouth daily at bedtime as needed for sleep, Disp: 90 tablet, Rfl: 1    methylPREDNISolone 4 MG tablet therapy pack, Use as directed on package (Patient not taking: No sig reported), Disp: 21 tablet, Rfl: 0    The following portions of the patient's history were reviewed and updated as appropriate: allergies, current medications, past family history, past medical history, past social history, past surgical history and problem list     Review of Systems   Constitutional: Negative  Negative for unexpected weight change  HENT: Negative  Eyes: Negative  Respiratory: Negative  Negative for shortness of breath  Cardiovascular: Negative  Negative for leg swelling  Gastrointestinal: Negative  Endocrine: Negative  Genitourinary: Negative  Musculoskeletal: Positive for gait problem (Wearing a cam walker after injuring her left foot)  Skin: Negative  Allergic/Immunologic: Negative  Hematological: Negative  Psychiatric/Behavioral: Negative              Objective:    /76   Pulse 72   Resp 16   Ht 5' 6" (1 676 m)   Wt 102 kg (224 lb)   SpO2 97%   BMI 36 15 kg/m²      Physical Exam      Recent Results (from the past 1008 hour(s))   Liquid-based pap, screening    Collection Time: 09/07/22  3:07 PM   Result Value Ref Range    Case Report       Gynecologic Cytology Report                       Case: [id number] Provider:  Rosana Moore DO   Collected:           09/07/2022 [id number]              Ordering Location:     [facility]  Received:            09/07/2022 [address] [facility]                                                        First Screen:          Mark Yancey                                                               Rescreen:              ABDELRAH[facility] [place] Specimen:    LIQUID-BASED PAP, SCREENING, Cervix, Endocervical                                          Primary Interpretation Negative for intraepithelial lesion or malignancy     Specimen Adequacy       Satisfactory for evaluation  Endocervical/transformation zone component present  Note               Additional Information       [employer]'s FDA approved ,  and ThinPrep Imaging Duo System are utilized with strict adherence to the 's instruction manual to prepare gynecologic and non-gynecologic cytology specimens for the production of ThinPrep slides as well as for gynecologic ThinPrep imaging  These processes have been validated by our laboratory and/or by the   The Pap test is not a diagnostic procedure and should not be used as the sole means to detect cervical cancer  It is only a screening procedure to aid in the detection of cervical cancer and its precursors  Both false-negative and false-positive results have been experienced  Your patient's test result should be interpreted in this context together with the history and clinical findings       HPV High Risk    Collection Time: 09/07/22  3:07 PM    Specimen: Thin-Prep Vial   Result Value Ref Range    HPV Other HR Negative Negative    HPV16 Negative Negative    HPV18 Negative Negative   CBC and differential    Collection Time: 09/26/22  7:00 AM   Result Value Ref Range    White Blood Cell Count 5 7 3 8 - 10 8 Thousand/uL    Red Blood Cell Count 4 96 3 80 - 5 10 Million/uL    Hemoglobin 13 6 11 7 - 15 5 g/dL    HCT 42 5 35 0 - 45 0 %    MCV 85 7 80 0 - 100 0 fL    MCH 27 4 27 0 - 33 0 pg    MCHC 32 0 32 0 - 36 0 g/dL    RDW 18 1 (H) 11 0 - 15 0 %    Platelet Count 265 547 - 400 Thousand/uL    SL AMB MPV 11 7 7 5 - 12 5 fL    Neutrophils (Absolute) 2,770 1,500 - 7,800 cells/uL    Lymphocytes (Absolute) 2,155 850 - 3,900 cells/uL    Monocytes (Absolute) 576 200 - 950 cells/uL    Eosinophils (Absolute) 108 15 - 500 cells/uL    Basophils ABS 91 0 - 200 cells/uL    Neutrophils 48 6 %    Lymphocytes 37 8 %    Monocytes 10 1 %    Eosinophils 1 9 %    Basophils PCT 1 6 %       Assessment/Plan:    Vitamin D deficiency  Continue on vitamin-D supplementation  Check vitamin-D level with next set of labs in January    Hypochromic microcytic anemia  Iron deficiency anemia  Patient is now eating red meat once a week  Recommended green leafy vegetables as well as beans as a good source of iron  Will discontinue iron supplementation and check repeat CBC and iron studies in approximately 3-4 months to see if she is able to maintain her iron levels and hemoglobin dietary early    Primary insomnia  Patient continues doing well with p r n  Ambien  Continue same  Contact the office 1 refills are necessary         thank you  Did you get Haim Brown of present? Problem List Items Addressed This Visit        Other    Annual physical exam    Hypochromic microcytic anemia     Iron deficiency anemia  Patient is now eating red meat once a week  Recommended green leafy vegetables as well as beans as a good source of iron  Will discontinue iron supplementation and check repeat CBC and iron studies in approximately 3-4 months to see if she is able to maintain her iron levels and hemoglobin dietary early         Relevant Orders    CBC and differential    Comprehensive metabolic panel    Iron Panel (Includes Ferritin, Iron Sat%, Iron, and TIBC)    Primary insomnia     Patient continues doing well with p r n  Ambien  Continue same  Contact the office 1 refills are necessary         Vitamin D deficiency - Primary     Continue on vitamin-D supplementation    Check vitamin-D level with next set of labs in January         Relevant Orders    Vitamin D 1,25 dihydroxy

## 2022-10-06 ENCOUNTER — CLINICAL SUPPORT (OUTPATIENT)
Dept: FAMILY MEDICINE CLINIC | Facility: CLINIC | Age: 67
End: 2022-10-06
Payer: COMMERCIAL

## 2022-10-06 DIAGNOSIS — Z23 NEED FOR VACCINATION: Primary | ICD-10-CM

## 2022-10-06 PROCEDURE — 90750 HZV VACC RECOMBINANT IM: CPT

## 2022-10-06 PROCEDURE — 90471 IMMUNIZATION ADMIN: CPT

## 2022-11-10 DIAGNOSIS — F51.01 PRIMARY INSOMNIA: ICD-10-CM

## 2022-11-14 ENCOUNTER — TELEPHONE (OUTPATIENT)
Dept: FAMILY MEDICINE CLINIC | Facility: CLINIC | Age: 67
End: 2022-11-14

## 2022-11-14 RX ORDER — ZOLPIDEM TARTRATE 10 MG/1
10 TABLET ORAL
Qty: 90 TABLET | Refills: 0 | Status: SHIPPED | OUTPATIENT
Start: 2022-11-14

## 2022-11-14 NOTE — TELEPHONE ENCOUNTER
Pt's called regarding pt's med refill for Zolpidem  He said she is completely out and would need it by today  Please advise

## 2022-11-14 NOTE — TELEPHONE ENCOUNTER
Refill was provided however she had last filled on August 22nd  Three months would be around November 22nd    She should still probably have a week left

## 2023-01-16 ENCOUNTER — OFFICE VISIT (OUTPATIENT)
Dept: URGENT CARE | Facility: CLINIC | Age: 68
End: 2023-01-16

## 2023-01-16 VITALS
HEART RATE: 67 BPM | TEMPERATURE: 97.8 F | HEIGHT: 66 IN | OXYGEN SATURATION: 99 % | BODY MASS INDEX: 35.03 KG/M2 | RESPIRATION RATE: 16 BRPM | WEIGHT: 218 LBS

## 2023-01-16 DIAGNOSIS — J06.9 VIRAL UPPER RESPIRATORY TRACT INFECTION: Primary | ICD-10-CM

## 2023-01-16 DIAGNOSIS — J34.89 SINUS PRESSURE: ICD-10-CM

## 2023-01-16 RX ORDER — PREDNISONE 20 MG/1
TABLET ORAL
Qty: 6 TABLET | Refills: 0 | Status: SHIPPED | OUTPATIENT
Start: 2023-01-16

## 2023-01-16 NOTE — PATIENT INSTRUCTIONS
--Rest, drink plenty of fluids  --Fill and start prednisone if no improvement sinus/ear pressure over next 2-3 days despite other measures below  --For nasal/sinus congestion, helpful measures include steam, warm compresses, an OTC saline nasal spray or Neti pot, or an OTC decongestant (such as Sudafed)  The decongestant should be avoided, however, if you are under 10years of age, or have a history of high blood pressure or heart disease  In addition, an OTC nasal steroid (Flonase, Nasocort) or nasal decongestant (Afrin, Isaias-synephrine) may be taken  The nasal steroid should be used at bedtime, after the saline nasal spray  The nasal decongestant should not be taken more than 3 days consecutively in order to prevent rebound congestion  --For nasal drainage, postnasal drip, sneezing and itching, an OTC antihistamine (Allegra, Benadryl, etc) can be taken  --For cough, you can take an OTC expectorant such as plain Robitussion or Mucinex (active ingredient guaifenesin)  A spoonful of honey at bedtime may also be helpful, as may a prescription cough medicine  Also recommended is the use of a cool mist humidifier (with or without Vicks) in the bedroom at night  --For sore throat, you can take OTC lozenges, use warm gargles (salt water or apple cider vinegar and honey), herbal teas, or an OTC throat spray (Chloraseptic)  --You can take Tylenol or Motrin/Advil as needed for fever, headache, body aches  Motrin/Advil should be avoided, however, if you have a history of heart disease, bleeding ulcers, or if you take blood thinners  --You should contact your primary care provider and/or go to the ER if your symptoms are not improved or get worse over the next 7 days  This includes new onset fever, localized ear pain, sinus pain, as well as worsening cough, chest pain, shortness of breath, or significant weakness/fatigue

## 2023-01-16 NOTE — PROGRESS NOTES
[facility]        NAME: Charo Bolden is a 74 y o  female  : 1955    MRN: [id number]  DATE: 2023  TIME: 9:38 AM    Assessment and Plan   Viral upper respiratory tract infection [J06 9]  1  Viral upper respiratory tract infection        2  Sinus pressure  predniSONE 20 mg tablet            Patient Instructions     --Rest, drink plenty of fluids  --Fill and start prednisone if no improvement sinus/ear pressure over next 2-3 days despite other measures below  --For nasal/sinus congestion, helpful measures include steam, warm compresses, an OTC saline nasal spray or Neti pot, or an OTC decongestant (such as Sudafed)  The decongestant should be avoided, however, if you are under 10years of age, or have a history of high blood pressure or heart disease  In addition, an OTC nasal steroid (Flonase, Nasocort) or nasal decongestant (Afrin, Isaias-synephrine) may be taken  The nasal steroid should be used at bedtime, after the saline nasal spray  The nasal decongestant should not be taken more than 3 days consecutively in order to prevent rebound congestion  --For nasal drainage, postnasal drip, sneezing and itching, an OTC antihistamine (Allegra, Benadryl, etc) can be taken  --For cough, you can take an OTC expectorant such as plain Robitussion or Mucinex (active ingredient guaifenesin)  A spoonful of honey at bedtime may also be helpful, as may a prescription cough medicine  Also recommended is the use of a cool mist humidifier (with or without Vicks) in the bedroom at night  --For sore throat, you can take OTC lozenges, use warm gargles (salt water or apple cider vinegar and honey), herbal teas, or an OTC throat spray (Chloraseptic)  --You can take Tylenol or Motrin/Advil as needed for fever, headache, body aches  Motrin/Advil should be avoided, however, if you have a history of heart disease, bleeding ulcers, or if you take blood thinners     --You should contact your primary care provider and/or go to the ER if your symptoms are not improved or get worse over the next 7 days  This includes new onset fever, localized ear pain, sinus pain, as well as worsening cough, chest pain, shortness of breath, or significant weakness/fatigue  Chief Complaint     Chief Complaint   Patient presents with   • Facial Pain     Pt reports that she has a HA and sinus pressure since Friday- it is worse and she also reports that it is affecting her hearing  History of Present Illness       Here with complaints of sinus/ear pressure, nasal congestion, clear rhinorrhea, sore throat, occasional cough x 4 days  No fever, headache, body aches  No abdominal pain, N/V/D  Negative home COVID test     Taking decongestant but not helping  Review of Systems   Review of Systems   Constitutional: Negative for fever  HENT: Positive for rhinorrhea, sinus pressure and sore throat  Respiratory: Positive for cough  Gastrointestinal: Negative for abdominal pain, nausea and vomiting  Musculoskeletal: Negative for myalgias  Neurological: Negative for headaches           Current Medications       Current Outpatient Medications:   •  montelukast (SINGULAIR) 10 mg tablet, Take 1 tablet (10 mg total) by mouth daily at bedtime, Disp: 90 tablet, Rfl: 3  •  predniSONE 20 mg tablet, TAKE 2 TAB PO DAILY X 3 DAYS, WITH FOOD, Disp: 6 tablet, Rfl: 0  •  zolpidem (AMBIEN) 10 mg tablet, Take 1 tablet (10 mg total) by mouth daily at bedtime as needed for sleep, Disp: 90 tablet, Rfl: 0  •  fluticasone (FLONASE) 50 mcg/act nasal spray, 1 spray into each nostril daily (Patient not taking: Reported on 1/16/2023), Disp: 11 1 mL, Rfl: 0  •  loratadine (CLARITIN) 10 mg tablet, Take 1 tablet (10 mg total) by mouth daily (Patient not taking: Reported on 1/16/2023), Disp: 30 tablet, Rfl: 0  •  multivitamin (THERAGRAN) TABS, Take 1 tablet by mouth daily (Patient not taking: Reported on 1/16/2023), Disp: , Rfl:   • neomycin-polymyxin-dexamethasone (MAXITROL) ophthalmic suspension, , Disp: , Rfl:     Current Allergies     Allergies as of 01/16/2023   • (No Known Allergies)            The following portions of the patient's history were reviewed and updated as appropriate: allergies, current medications, past family history, past medical history, past social history, past surgical history and problem list      Past Medical History:   Diagnosis Date   • Anemia    • Cortical age-related cataract of both eyes 08/06/2020   • Ovarian cyst     Right   • Raynaud phenomenon        Past Surgical History:   Procedure Laterality Date   • CAUTERIZE INNER NOSE     • REPLACEMENT TOTAL KNEE Bilateral     2012/2013       Family History   Problem Relation Age of Onset   • Other Brother         Brain tumor    • Diabetes Mother    • Osteoarthritis Mother    • Diabetes Father    • Aneurysm Father    • Other Brother    • Multiple myeloma Brother    • No Known Problems Paternal Aunt          Medications have been verified  Objective   Pulse 67   Temp 97 8 °F (36 6 °C)   Resp 16   Ht 5' 6" (1 676 m)   Wt 98 9 kg (218 lb)   SpO2 99%   BMI 35 19 kg/m²   No LMP recorded  Patient is postmenopausal        Physical Exam     Physical Exam  Constitutional:       General: She is not in acute distress  Appearance: Normal appearance  She is well-developed  She is not ill-appearing, toxic-appearing or diaphoretic  HENT:      Head: Normocephalic  Right Ear: Tympanic membrane, ear canal and external ear normal       Left Ear: Tympanic membrane, ear canal and external ear normal       Nose: Congestion and rhinorrhea present  Comments: No sinus tenderness  Mouth/Throat:      Pharynx: Posterior oropharyngeal erythema present  No oropharyngeal exudate  Comments: Tonsils 1+, mildly erythematous without exudate  Eyes:      General:         Right eye: No discharge  Left eye: No discharge     Cardiovascular:      Rate and Rhythm: Normal rate and regular rhythm  Heart sounds: Normal heart sounds  No murmur heard  Pulmonary:      Effort: Pulmonary effort is normal  No respiratory distress  Breath sounds: Normal breath sounds  No stridor  No wheezing, rhonchi or rales  Chest:      Chest wall: No tenderness  Abdominal:      Tenderness: There is no abdominal tenderness  Musculoskeletal:      Cervical back: Neck supple  Lymphadenopathy:      Cervical: No cervical adenopathy  Skin:     General: Skin is warm and dry  Neurological:      Mental Status: She is alert and oriented to person, place, and time  Deep Tendon Reflexes: Reflexes are normal and symmetric     Psychiatric:         Mood and Affect: Mood normal

## 2023-01-19 LAB
25(OH)D3 SERPL-MCNC: 41 NG/ML (ref 30–100)
ALBUMIN SERPL-MCNC: 4.1 G/DL (ref 3.6–5.1)
ALBUMIN/GLOB SERPL: 1.5 (CALC) (ref 1–2.5)
ALP SERPL-CCNC: 70 U/L (ref 37–153)
ALT SERPL-CCNC: 23 U/L (ref 6–29)
AST SERPL-CCNC: 19 U/L (ref 10–35)
BASOPHILS # BLD AUTO: 41 CELLS/UL (ref 0–200)
BASOPHILS NFR BLD AUTO: 0.9 %
BILIRUB SERPL-MCNC: 0.8 MG/DL (ref 0.2–1.2)
BUN SERPL-MCNC: 22 MG/DL (ref 7–25)
BUN/CREAT SERPL: NORMAL (CALC) (ref 6–22)
CALCIUM SERPL-MCNC: 9.5 MG/DL (ref 8.6–10.4)
CHLORIDE SERPL-SCNC: 102 MMOL/L (ref 98–110)
CO2 SERPL-SCNC: 29 MMOL/L (ref 20–32)
CREAT SERPL-MCNC: 0.8 MG/DL (ref 0.5–1.05)
EOSINOPHIL # BLD AUTO: 90 CELLS/UL (ref 15–500)
EOSINOPHIL NFR BLD AUTO: 2 %
ERYTHROCYTE [DISTWIDTH] IN BLOOD BY AUTOMATED COUNT: 13.3 % (ref 11–15)
FERRITIN SERPL-MCNC: 45 NG/ML (ref 16–288)
GFR/BSA.PRED SERPLBLD CYS-BASED-ARV: 82 ML/MIN/1.73M2
GLOBULIN SER CALC-MCNC: 2.7 G/DL (CALC) (ref 1.9–3.7)
GLUCOSE SERPL-MCNC: 93 MG/DL (ref 65–99)
HCT VFR BLD AUTO: 42 % (ref 35–45)
HGB BLD-MCNC: 13.7 G/DL (ref 11.7–15.5)
IRON SATN MFR SERPL: 38 % (CALC) (ref 16–45)
IRON SERPL-MCNC: 126 MCG/DL (ref 45–160)
LYMPHOCYTES # BLD AUTO: 1787 CELLS/UL (ref 850–3900)
LYMPHOCYTES NFR BLD AUTO: 39.7 %
MCH RBC QN AUTO: 29.8 PG (ref 27–33)
MCHC RBC AUTO-ENTMCNC: 32.6 G/DL (ref 32–36)
MCV RBC AUTO: 91.3 FL (ref 80–100)
MONOCYTES # BLD AUTO: 464 CELLS/UL (ref 200–950)
MONOCYTES NFR BLD AUTO: 10.3 %
NEUTROPHILS # BLD AUTO: 2120 CELLS/UL (ref 1500–7800)
NEUTROPHILS NFR BLD AUTO: 47.1 %
PLATELET # BLD AUTO: 170 THOUSAND/UL (ref 140–400)
PMV BLD REES-ECKER: 11.3 FL (ref 7.5–12.5)
POTASSIUM SERPL-SCNC: 4 MMOL/L (ref 3.5–5.3)
PROT SERPL-MCNC: 6.8 G/DL (ref 6.1–8.1)
RBC # BLD AUTO: 4.6 MILLION/UL (ref 3.8–5.1)
SODIUM SERPL-SCNC: 138 MMOL/L (ref 135–146)
TIBC SERPL-MCNC: 334 MCG/DL (CALC) (ref 250–450)
WBC # BLD AUTO: 4.5 THOUSAND/UL (ref 3.8–10.8)

## 2023-01-22 DIAGNOSIS — J30.2 SEASONAL ALLERGIES: ICD-10-CM

## 2023-01-23 RX ORDER — MONTELUKAST SODIUM 10 MG/1
10 TABLET ORAL
Qty: 90 TABLET | Refills: 0 | Status: SHIPPED | OUTPATIENT
Start: 2023-01-23

## 2023-01-30 DIAGNOSIS — F51.01 PRIMARY INSOMNIA: ICD-10-CM

## 2023-01-30 NOTE — TELEPHONE ENCOUNTER
Patients appointment had to be rescheduled but will run out of meds prior to her next appt        PDMP checked and last fill was 11/14/2022 for #90

## 2023-02-01 RX ORDER — ZOLPIDEM TARTRATE 10 MG/1
10 TABLET ORAL
Qty: 90 TABLET | Refills: 0 | Status: SHIPPED | OUTPATIENT
Start: 2023-02-01

## 2023-02-10 ENCOUNTER — OFFICE VISIT (OUTPATIENT)
Dept: FAMILY MEDICINE CLINIC | Facility: CLINIC | Age: 68
End: 2023-02-10

## 2023-02-10 VITALS
SYSTOLIC BLOOD PRESSURE: 132 MMHG | HEIGHT: 66 IN | DIASTOLIC BLOOD PRESSURE: 72 MMHG | BODY MASS INDEX: 35.84 KG/M2 | HEART RATE: 68 BPM | RESPIRATION RATE: 16 BRPM | OXYGEN SATURATION: 96 % | WEIGHT: 223 LBS

## 2023-02-10 DIAGNOSIS — D50.9 HYPOCHROMIC MICROCYTIC ANEMIA: ICD-10-CM

## 2023-02-10 DIAGNOSIS — E55.9 VITAMIN D DEFICIENCY: ICD-10-CM

## 2023-02-10 DIAGNOSIS — Z00.00 ANNUAL PHYSICAL EXAM: ICD-10-CM

## 2023-02-10 DIAGNOSIS — Z23 NEED FOR SHINGLES VACCINE: Primary | ICD-10-CM

## 2023-02-10 NOTE — ASSESSMENT & PLAN NOTE
Iron deficiency anemia  Significant improvement in patient's iron level  She remains on iron supplementation over-the-counter and has increased her oral intake of red meats    Repeat iron studies and CBC in 6 months

## 2023-02-10 NOTE — PROGRESS NOTES
Subjective:      Patient ID: Delmar Bosworth is a 74 y o  female  81-year-old female presents for follow-up regarding iron deficiency anemia  Patient has increased her intake of red meat and is also taking iron supplementation  Significant improvement in her microcytic indices and her iron levels  She does notice a difference in regards to her energy  Overall feeling well  Labs reviewed which showed vitamin D level 41, serum iron 126, TIBC 334, iron saturation 38, ferritin improved at 45, normal CMP, CBC  Hemoglobin at 13 7      Past Medical History:   Diagnosis Date   • Anemia    • Cortical age-related cataract of both eyes 08/06/2020   • Ovarian cyst     Right   • Raynaud phenomenon        Family History   Problem Relation Age of Onset   • Other Brother         Brain tumor    • Diabetes Mother    • Osteoarthritis Mother    • Diabetes Father    • Aneurysm Father    • Other Brother    • Multiple myeloma Brother    • No Known Problems Paternal Aunt        Past Surgical History:   Procedure Laterality Date   • CAUTERIZE INNER NOSE     • REPLACEMENT TOTAL KNEE Bilateral     2012/2013        reports that she has never smoked  She has never used smokeless tobacco  She reports current alcohol use  She reports that she does not use drugs        Current Outpatient Medications:   •  fluticasone (FLONASE) 50 mcg/act nasal spray, 1 spray into each nostril daily, Disp: 11 1 mL, Rfl: 0  •  loratadine (CLARITIN) 10 mg tablet, Take 1 tablet (10 mg total) by mouth daily, Disp: 30 tablet, Rfl: 0  •  montelukast (SINGULAIR) 10 mg tablet, Take 1 tablet (10 mg total) by mouth daily at bedtime, Disp: 90 tablet, Rfl: 0  •  multivitamin (THERAGRAN) TABS, Take 1 tablet by mouth daily, Disp: , Rfl:   •  neomycin-polymyxin-dexamethasone (MAXITROL) ophthalmic suspension, , Disp: , Rfl:   •  travoprost (TRAVATAN-Z) 0 004 % ophthalmic solution, , Disp: , Rfl:   •  zolpidem (AMBIEN) 10 mg tablet, Take 1 tablet (10 mg total) by mouth daily at bedtime as needed for sleep, Disp: 90 tablet, Rfl: 0    The following portions of the patient's history were reviewed and updated as appropriate: allergies, current medications, past family history, past medical history, past social history, past surgical history and problem list     Review of Systems   Constitutional: Negative  HENT: Negative  Eyes: Negative  Respiratory: Negative  Cardiovascular: Negative  Gastrointestinal: Negative  Endocrine: Negative  Genitourinary: Negative  Musculoskeletal: Negative  Skin: Negative  Allergic/Immunologic: Negative  Neurological: Negative  Hematological: Negative  Psychiatric/Behavioral: Negative  Objective:    /72   Pulse 68   Resp 16   Ht 5' 6" (1 676 m)   Wt 101 kg (223 lb)   SpO2 96%   BMI 35 99 kg/m²      Physical Exam  Vitals and nursing note reviewed  Constitutional:       General: She is not in acute distress  Appearance: Normal appearance  She is well-developed and normal weight  She is not diaphoretic  HENT:      Head: Normocephalic and atraumatic  Right Ear: External ear normal       Left Ear: External ear normal       Nose: Nose normal    Eyes:      Extraocular Movements: Extraocular movements intact  Conjunctiva/sclera: Conjunctivae normal       Pupils: Pupils are equal, round, and reactive to light  Cardiovascular:      Rate and Rhythm: Normal rate and regular rhythm  Heart sounds: Normal heart sounds  No murmur heard  Pulmonary:      Effort: Pulmonary effort is normal       Breath sounds: Normal breath sounds  Abdominal:      General: Abdomen is flat  Bowel sounds are normal       Palpations: Abdomen is soft  Musculoskeletal:         General: Normal range of motion  Cervical back: Normal range of motion and neck supple  Skin:     General: Skin is warm and dry  Findings: No rash  Neurological:      General: No focal deficit present        Mental Status: She is alert and oriented to person, place, and time  Mental status is at baseline  Deep Tendon Reflexes: Reflexes are normal and symmetric  Psychiatric:         Mood and Affect: Mood normal          Behavior: Behavior normal          Thought Content:  Thought content normal          Judgment: Judgment normal            Recent Results (from the past 1008 hour(s))   TIBC    Collection Time: 01/19/23  6:46 AM   Result Value Ref Range    Iron, Serum 126 45 - 160 mcg/dL    Total Iron Binding Capacity (TIBC) 334 250 - 450 mcg/dL (calc)    Iron Saturation 38 16 - 45 % (calc)   Comprehensive metabolic panel    Collection Time: 01/19/23  6:46 AM   Result Value Ref Range    Glucose, Random 93 65 - 99 mg/dL    BUN 22 7 - 25 mg/dL    Creatinine 0 80 0 50 - 1 05 mg/dL    eGFR 82 > OR = 60 mL/min/1 73m2    SL AMB BUN/CREATININE RATIO NOT APPLICABLE 6 - 22 (calc)    Sodium 138 135 - 146 mmol/L    Potassium 4 0 3 5 - 5 3 mmol/L    Chloride 102 98 - 110 mmol/L    CO2 29 20 - 32 mmol/L    Calcium 9 5 8 6 - 10 4 mg/dL    Protein, Total 6 8 6 1 - 8 1 g/dL    Albumin 4 1 3 6 - 5 1 g/dL    Globulin 2 7 1 9 - 3 7 g/dL (calc)    Albumin/Globulin Ratio 1 5 1 0 - 2 5 (calc)    TOTAL BILIRUBIN 0 8 0 2 - 1 2 mg/dL    Alkaline Phosphatase 70 37 - 153 U/L    AST 19 10 - 35 U/L    ALT 23 6 - 29 U/L   CBC and differential    Collection Time: 01/19/23  6:46 AM   Result Value Ref Range    White Blood Cell Count 4 5 3 8 - 10 8 Thousand/uL    Red Blood Cell Count 4 60 3 80 - 5 10 Million/uL    Hemoglobin 13 7 11 7 - 15 5 g/dL    HCT 42 0 35 0 - 45 0 %    MCV 91 3 80 0 - 100 0 fL    MCH 29 8 27 0 - 33 0 pg    MCHC 32 6 32 0 - 36 0 g/dL    RDW 13 3 11 0 - 15 0 %    Platelet Count 552 991 - 400 Thousand/uL    SL AMB MPV 11 3 7 5 - 12 5 fL    Neutrophils (Absolute) 2,120 1,500 - 7,800 cells/uL    Lymphocytes (Absolute) 1,787 850 - 3,900 cells/uL    Monocytes (Absolute) 464 200 - 950 cells/uL    Eosinophils (Absolute) 90 15 - 500 cells/uL Basophils ABS 41 0 - 200 cells/uL    Neutrophils 47 1 %    Lymphocytes 39 7 %    Monocytes 10 3 %    Eosinophils 2 0 %    Basophils PCT 0 9 %   Ferritin    Collection Time: 01/19/23  6:46 AM   Result Value Ref Range    Ferritin 45 16 - 288 ng/mL   Vitamin D 25 hydroxy    Collection Time: 01/19/23  6:46 AM   Result Value Ref Range    Vitamin D, 25-Hydroxy, Serum 41 30 - 100 ng/mL       Assessment/Plan:    Vitamin D deficiency  Therapeutic vitamin D level  Check repeat vitamin D level in 6 months    Need for shingles vaccine  Second dose of Shingrix provided in the office    Hypochromic microcytic anemia  Iron deficiency anemia  Significant improvement in patient's iron level  She remains on iron supplementation over-the-counter and has increased her oral intake of red meats  Repeat iron studies and CBC in 6 months          Problem List Items Addressed This Visit        Other    Annual physical exam    Relevant Orders    Comprehensive metabolic panel    Lipid panel    TSH, 3rd generation with Free T4 reflex    Hypochromic microcytic anemia     Iron deficiency anemia  Significant improvement in patient's iron level  She remains on iron supplementation over-the-counter and has increased her oral intake of red meats  Repeat iron studies and CBC in 6 months         Relevant Orders    CBC and differential    Iron Panel (Includes Ferritin, Iron Sat%, Iron, and TIBC)    Need for shingles vaccine - Primary     Second dose of Shingrix provided in the office         Relevant Orders    Zoster Vaccine Recombinant IM (Completed)    Vitamin D deficiency     Therapeutic vitamin D level    Check repeat vitamin D level in 6 months         Relevant Orders    Vitamin D 1,25 dihydroxy

## 2023-02-16 ENCOUNTER — HOSPITAL ENCOUNTER (OUTPATIENT)
Dept: MAMMOGRAPHY | Facility: HOSPITAL | Age: 68
Discharge: HOME | End: 2023-02-16

## 2023-02-16 VITALS — BODY MASS INDEX: 35.79 KG/M2 | WEIGHT: 222.66 LBS | HEIGHT: 66 IN

## 2023-02-16 DIAGNOSIS — Z12.31 ENCOUNTER FOR SCREENING MAMMOGRAM FOR MALIGNANT NEOPLASM OF BREAST: ICD-10-CM

## 2023-03-12 NOTE — ASSESSMENT & PLAN NOTE
/78 (BP Location: Right arm)   Pulse 106   Temp 99  F (37.2  C) (Oral)   Resp 15   Wt 72.8 kg (160 lb 6.4 oz)   SpO2 100%   BMI 26.69 kg/m      [id number]. VSS on RA, afebrile. Patient continues to endorse pain, particularly after resting. Difficult to assess pain because patient never appears in pain, only reporting it upon being woken up by RN. PRN oxy administered x1. Patient requested zofran although denied nausea. Insulin gtt discontinued this shift, BG q4hr (124 at 1600). Home dose of lantus insulin restarted today, continue with carb coverage. Regular diet with nadja counts, poor appetite despite encouragement from RN. Patient verbalized that he understands that he needs to eat in order for the TF to be stopped. Patient dialyzed today. Cycled TF started late (1630) as patient was in dialysis; running cycled 2p-8a at 65 mL/hr. LBM 3/11, diarrhea slowing. Oliguric. UA/UC sent today. Clamshell incision stapled, CHERYL. Tape burns/old rash noted on abdomen. Med card and lab book up to date. Patient & mom still need to meet with specialty pharmacy. Continue with plan of care and update team with any changes.     Continue on vitamin-D supplementation    Check vitamin-D level with next set of labs in January

## 2023-08-25 LAB
25(OH)D3 SERPL-MCNC: 35 NG/ML (ref 30–100)
ALBUMIN SERPL-MCNC: 4.2 G/DL (ref 3.6–5.1)
ALBUMIN/GLOB SERPL: 1.6 (CALC) (ref 1–2.5)
ALP SERPL-CCNC: 78 U/L (ref 37–153)
ALT SERPL-CCNC: 16 U/L (ref 6–29)
AST SERPL-CCNC: 15 U/L (ref 10–35)
BASOPHILS # BLD AUTO: 68 CELLS/UL (ref 0–200)
BASOPHILS NFR BLD AUTO: 1.2 %
BILIRUB SERPL-MCNC: 0.9 MG/DL (ref 0.2–1.2)
BUN SERPL-MCNC: 22 MG/DL (ref 7–25)
BUN/CREAT SERPL: NORMAL (CALC) (ref 6–22)
CALCIUM SERPL-MCNC: 9.4 MG/DL (ref 8.6–10.4)
CHLORIDE SERPL-SCNC: 104 MMOL/L (ref 98–110)
CHOLEST SERPL-MCNC: 230 MG/DL
CHOLEST/HDLC SERPL: 2.4 (CALC)
CO2 SERPL-SCNC: 31 MMOL/L (ref 20–32)
CREAT SERPL-MCNC: 0.85 MG/DL (ref 0.5–1.05)
EOSINOPHIL # BLD AUTO: 103 CELLS/UL (ref 15–500)
EOSINOPHIL NFR BLD AUTO: 1.8 %
ERYTHROCYTE [DISTWIDTH] IN BLOOD BY AUTOMATED COUNT: 12.8 % (ref 11–15)
FERRITIN SERPL-MCNC: 43 NG/ML (ref 16–288)
GFR/BSA.PRED SERPLBLD CYS-BASED-ARV: 76 ML/MIN/1.73M2
GLOBULIN SER CALC-MCNC: 2.6 G/DL (CALC) (ref 1.9–3.7)
GLUCOSE SERPL-MCNC: 94 MG/DL (ref 65–99)
HCT VFR BLD AUTO: 42.1 % (ref 35–45)
HDLC SERPL-MCNC: 96 MG/DL
HGB BLD-MCNC: 13.9 G/DL (ref 11.7–15.5)
IRON SATN MFR SERPL: 30 % (CALC) (ref 16–45)
IRON SERPL-MCNC: 93 MCG/DL (ref 45–160)
LDLC SERPL CALC-MCNC: 119 MG/DL (CALC)
LYMPHOCYTES # BLD AUTO: 1898 CELLS/UL (ref 850–3900)
LYMPHOCYTES NFR BLD AUTO: 33.3 %
MCH RBC QN AUTO: 30.1 PG (ref 27–33)
MCHC RBC AUTO-ENTMCNC: 33 G/DL (ref 32–36)
MCV RBC AUTO: 91.1 FL (ref 80–100)
MONOCYTES # BLD AUTO: 564 CELLS/UL (ref 200–950)
MONOCYTES NFR BLD AUTO: 9.9 %
NEUTROPHILS # BLD AUTO: 3067 CELLS/UL (ref 1500–7800)
NEUTROPHILS NFR BLD AUTO: 53.8 %
NONHDLC SERPL-MCNC: 134 MG/DL (CALC)
PLATELET # BLD AUTO: 177 THOUSAND/UL (ref 140–400)
PMV BLD REES-ECKER: 11.6 FL (ref 7.5–12.5)
POTASSIUM SERPL-SCNC: 4.4 MMOL/L (ref 3.5–5.3)
PROT SERPL-MCNC: 6.8 G/DL (ref 6.1–8.1)
RBC # BLD AUTO: 4.62 MILLION/UL (ref 3.8–5.1)
SODIUM SERPL-SCNC: 140 MMOL/L (ref 135–146)
TIBC SERPL-MCNC: 313 MCG/DL (CALC) (ref 250–450)
TRIGL SERPL-MCNC: 59 MG/DL
TSH SERPL-ACNC: 2.64 MIU/L (ref 0.4–4.5)
WBC # BLD AUTO: 5.7 THOUSAND/UL (ref 3.8–10.8)

## 2023-09-08 ENCOUNTER — OFFICE VISIT (OUTPATIENT)
Dept: FAMILY MEDICINE CLINIC | Facility: CLINIC | Age: 68
End: 2023-09-08

## 2023-09-08 VITALS
HEART RATE: 82 BPM | HEIGHT: 66 IN | RESPIRATION RATE: 16 BRPM | WEIGHT: 225 LBS | SYSTOLIC BLOOD PRESSURE: 138 MMHG | DIASTOLIC BLOOD PRESSURE: 78 MMHG | OXYGEN SATURATION: 98 % | BODY MASS INDEX: 36.16 KG/M2

## 2023-09-08 DIAGNOSIS — D50.9 HYPOCHROMIC MICROCYTIC ANEMIA: ICD-10-CM

## 2023-09-08 DIAGNOSIS — J30.2 SEASONAL ALLERGIES: ICD-10-CM

## 2023-09-08 DIAGNOSIS — Z23 FLU VACCINE NEED: ICD-10-CM

## 2023-09-08 DIAGNOSIS — E55.9 VITAMIN D DEFICIENCY: ICD-10-CM

## 2023-09-08 DIAGNOSIS — F51.01 PRIMARY INSOMNIA: ICD-10-CM

## 2023-09-08 DIAGNOSIS — Z00.00 ANNUAL PHYSICAL EXAM: Primary | ICD-10-CM

## 2023-09-08 RX ORDER — ZOLPIDEM TARTRATE 10 MG/1
10 TABLET ORAL
Qty: 90 TABLET | Refills: 1 | Status: SHIPPED | OUTPATIENT
Start: 2023-09-08

## 2023-09-08 RX ORDER — BIMATOPROST 0.1 MG/ML
SOLUTION/ DROPS OPHTHALMIC
COMMUNITY
Start: 2023-08-14

## 2023-09-08 NOTE — ASSESSMENT & PLAN NOTE
Patient continues doing well with as needed Ambien which she does take on a regular basis.   Hopefully after she retires she will be able to get back onto a normal sleep schedule where she is not waking up so early    Refill provided of as needed Ambien

## 2023-09-08 NOTE — PROGRESS NOTES
Subjective:      Patient ID: Ksenia Barryr is a 79 y.o. female. 80-year-old female presents for follow-up and review of labs. Patient is not taking calcium channel blocker for Raynaud's phenomenon. She wears heavy socks or gloves. Overall has been feeling well. Hemoglobin at 13.9, ferritin 43, TSH 2.64, elevated cholesterol but very high HDL. Planning on [facility] January 5. Hopefully gets into a normal sleep schedule. Still taking Ambien at bedtime. Does need refill. Past Medical History:   Diagnosis Date   • Anemia    • Cortical age-related cataract of both eyes 08/06/2020   • Ovarian cyst     Right   • Raynaud phenomenon        Family History   Problem Relation Age of Onset   • Diabetes Mother    • Osteoarthritis Mother    • Diabetes Father    • Aneurysm Father    • Heart disease Brother    • Hyperlipidemia Brother    • Coronary artery disease Brother    • Other Brother         Brain tumor    • Other Brother    • Multiple myeloma Brother    • No Known Problems Paternal Aunt        Past Surgical History:   Procedure Laterality Date   • CAUTERIZE INNER NOSE     • REPLACEMENT TOTAL KNEE Bilateral     2012/2013        reports that she has never smoked. She has never used smokeless tobacco. She reports current alcohol use. She reports that she does not use drugs.       Current Outpatient Medications:   •  bimatoprost (Lumigan) 0.01 % ophthalmic drops, , Disp: , Rfl:   •  fluticasone (FLONASE) 50 mcg/act nasal spray, 1 spray into each nostril daily, Disp: 11.1 mL, Rfl: 0  •  loratadine (CLARITIN) 10 mg tablet, Take 1 tablet (10 mg total) by mouth daily, Disp: 30 tablet, Rfl: 0  •  montelukast (SINGULAIR) 10 mg tablet, Take 1 tablet (10 mg total) by mouth daily at bedtime, Disp: 90 tablet, Rfl: 3  •  multivitamin (THERAGRAN) TABS, Take 1 tablet by mouth daily, Disp: , Rfl:   •  neomycin-polymyxin-dexamethasone (MAXITROL) ophthalmic suspension, , Disp: , Rfl:   •  zolpidem (AMBIEN) 10 mg tablet, Take 1 tablet (10 mg total) by mouth daily at bedtime as needed for sleep, Disp: 90 tablet, Rfl: 1    The following portions of the patient's history were reviewed and updated as appropriate: allergies, current medications, past family history, past medical history, past social history, past surgical history and problem list.    Review of Systems   Constitutional: Negative. HENT: Negative. Eyes: Negative. Respiratory: Negative. Cardiovascular: Negative. Gastrointestinal: Negative. Endocrine: Negative. Genitourinary: Negative. Musculoskeletal: Negative. Skin: Negative. Allergic/Immunologic: Negative. Neurological: Negative. Hematological: Negative. Psychiatric/Behavioral: Negative. Objective:    /78   Pulse 82   Resp 16   Ht 5' 6" (1.676 m)   Wt 102 kg (225 lb)   SpO2 98%   BMI 36.32 kg/m²      Physical Exam  Vitals and nursing note reviewed. Constitutional:       General: She is not in acute distress. Appearance: She is well-developed. She is not diaphoretic. HENT:      Head: Normocephalic and atraumatic. Right Ear: External ear normal.      Left Ear: External ear normal.      Nose: Nose normal.   Eyes:      Conjunctiva/sclera: Conjunctivae normal.      Pupils: Pupils are equal, round, and reactive to light. Cardiovascular:      Rate and Rhythm: Normal rate and regular rhythm. Heart sounds: Normal heart sounds. No murmur heard. Pulmonary:      Effort: Pulmonary effort is normal.      Breath sounds: Normal breath sounds. Abdominal:      General: Bowel sounds are normal.      Palpations: Abdomen is soft. Musculoskeletal:         General: Normal range of motion. Cervical back: Normal range of motion and neck supple. Skin:     General: Skin is warm and dry. Findings: No rash. Neurological:      Mental Status: She is alert and oriented to person, place, and time.       Deep Tendon Reflexes: Reflexes are normal and symmetric. Psychiatric:         Behavior: Behavior normal.         Thought Content:  Thought content normal.         Judgment: Judgment normal.           Recent Results (from the past 1008 hour(s))   Lipid panel    Collection Time: 08/25/23  6:37 AM   Result Value Ref Range    Total Cholesterol 230 (H) <200 mg/dL    HDL 96 > OR = 50 mg/dL    Triglycerides 59 <150 mg/dL    LDL Calculated 119 (H) mg/dL (calc)    Chol HDLC Ratio 2.4 <5.0 (calc)    Non-HDL Cholesterol 134 (H) <130 mg/dL (calc)   TIBC    Collection Time: 08/25/23  6:37 AM   Result Value Ref Range    Iron, Serum 93 45 - 160 mcg/dL    Total Iron Binding Capacity (TIBC) 313 250 - 450 mcg/dL (calc)    Iron Saturation 30 16 - 45 % (calc)   Comprehensive metabolic panel    Collection Time: 08/25/23  6:37 AM   Result Value Ref Range    Glucose, Random 94 65 - 99 mg/dL    BUN 22 7 - 25 mg/dL    Creatinine 0.85 0.50 - 1.05 mg/dL    eGFR 76 > OR = 60 mL/min/1.73m2    SL AMB BUN/CREATININE RATIO SEE NOTE: 6 - 22 (calc)    Sodium 140 135 - 146 mmol/L    Potassium 4.4 3.5 - 5.3 mmol/L    Chloride 104 98 - 110 mmol/L    CO2 31 20 - 32 mmol/L    Calcium 9.4 8.6 - 10.4 mg/dL    Protein, Total 6.8 6.1 - 8.1 g/dL    Albumin 4.2 3.6 - 5.1 g/dL    Globulin 2.6 1.9 - 3.7 g/dL (calc)    Albumin/Globulin Ratio 1.6 1.0 - 2.5 (calc)    TOTAL BILIRUBIN 0.9 0.2 - 1.2 mg/dL    Alkaline Phosphatase 78 37 - 153 U/L    AST 15 10 - 35 U/L    ALT 16 6 - 29 U/L   CBC and differential    Collection Time: 08/25/23  6:37 AM   Result Value Ref Range    White Blood Cell Count 5.7 3.8 - 10.8 Thousand/uL    Red Blood Cell Count 4.62 3.80 - 5.10 Million/uL    Hemoglobin 13.9 11.7 - 15.5 g/dL    HCT 42.1 35.0 - 45.0 %    MCV 91.1 80.0 - 100.0 fL    MCH 30.1 27.0 - 33.0 pg    MCHC 33.0 32.0 - 36.0 g/dL    RDW 12.8 11.0 - 15.0 %    Platelet Count 704 469 - 400 Thousand/uL    SL AMB MPV 11.6 7.5 - 12.5 fL    Neutrophils (Absolute) 3,067 1,500 - 7,800 cells/uL    Lymphocytes (Absolute) 1,898 850 - 3,900 cells/uL    Monocytes (Absolute) 564 200 - 950 cells/uL    Eosinophils (Absolute) 103 15 - 500 cells/uL    Basophils ABS 68 0 - 200 cells/uL    Neutrophils 53.8 %    Lymphocytes 33.3 %    Monocytes 9.9 %    Eosinophils 1.8 %    Basophils PCT 1.2 %   Ferritin    Collection Time: 08/25/23  6:37 AM   Result Value Ref Range    Ferritin 43 16 - 288 ng/mL   TSH, 3rd generation with Free T4 reflex    Collection Time: 08/25/23  6:37 AM   Result Value Ref Range    TSH W/RFX TO FREE T4 2.64 0.40 - 4.50 mIU/L   Vitamin D 25 hydroxy    Collection Time: 08/25/23  6:37 AM   Result Value Ref Range    Vitamin D, 25-Hydroxy, Serum 35 30 - 100 ng/mL       Assessment/Plan:    Vitamin D deficiency  Therapeutic vitamin D level. Repeat vitamin D level in 6 months    Seasonal allergies  Seasonal allergies remain very well controlled on Singulair 10 mg once daily. Continue same. Primary insomnia  Patient continues doing well with as needed Ambien which she does take on a regular basis. Hopefully after she retires she will be able to get back onto a normal sleep schedule where she is not waking up so early    Refill provided of as needed Ambien    Hypochromic microcytic anemia  Remains on iron supplementation over-the-counter. Repeat iron studies in 6 months    Annual physical exam  Annual physical examination performed          Problem List Items Addressed This Visit        Other    Annual physical exam - Primary     Annual physical examination performed         Flu vaccine need    Relevant Orders    influenza vaccine, high-dose, PF 0.7 mL (FLUZONE HIGH-DOSE) (Completed)    Hypochromic microcytic anemia     Remains on iron supplementation over-the-counter. Repeat iron studies in 6 months         Relevant Orders    CBC and differential    Iron Panel (Includes Ferritin, Iron Sat%, Iron, and TIBC)    Primary insomnia     Patient continues doing well with as needed Ambien which she does take on a regular basis.   Hopefully after she retires she will be able to get back onto a normal sleep schedule where she is not waking up so early    Refill provided of as needed Ambien         Relevant Medications    zolpidem (AMBIEN) 10 mg tablet    Other Relevant Orders    TSH, 3rd generation with Free T4 reflex    Seasonal allergies     Seasonal allergies remain very well controlled on Singulair 10 mg once daily. Continue same. Vitamin D deficiency     Therapeutic vitamin D level.   Repeat vitamin D level in 6 months         Relevant Orders    Vitamin D 1,25 dihydroxy

## 2023-10-05 ENCOUNTER — TELEPHONE (OUTPATIENT)
Age: 68
End: 2023-10-05

## 2023-10-05 ENCOUNTER — TELEMEDICINE (OUTPATIENT)
Dept: FAMILY MEDICINE CLINIC | Facility: CLINIC | Age: 68
End: 2023-10-05
Payer: COMMERCIAL

## 2023-10-05 VITALS — WEIGHT: 225 LBS | BODY MASS INDEX: 36.16 KG/M2 | HEIGHT: 66 IN

## 2023-10-05 DIAGNOSIS — U07.1 COVID-19: Primary | ICD-10-CM

## 2023-10-05 PROCEDURE — 99213 OFFICE O/P EST LOW 20 MIN: CPT | Performed by: FAMILY MEDICINE

## 2023-10-05 NOTE — PROGRESS NOTES
COVID-19 Outpatient Progress Note    Assessment/Plan:    Problem List Items Addressed This Visit        Other    COVID-19 - Primary     - Patient with mild COVID-19 symptoms initially diagnosed over 5 days ago. Very little benefit from antiviral treatment with Paxlovid    -Advised patient of CDC guidelines for self-isolation and when to end isolation. Patient could end isolation today but should wear a mask if she returns to work. COVID-19 rapid antigen swab can be positive for several weeks           Disposition:     Patient with moderate or severe COVID-19. They should isolate from others through at least day 10. Isolation can be ended if symptoms are improving and they are fever free for the past 24 hours. If they still have fever or other symptoms have not improved, continue to isolate until they improve. Regardless of when you isolation is ended, avoid being around people who are more likely to get very sick from COVID-19 until at least day 11. Patient's with severe COVID-19 may need to isolate up to 20 days from symptom onset. Discussed symptom directed medication options with patient. Discussed vitamin D, vitamin C, and/or zinc supplementation with patient. I have spent a total time of 8 minutes on the day of the encounter for this patient including discussing diagnostic results, discussing prognosis, risks and benefits of treatment options, instructions for management, patient and family education and impressions. - Advised on supportive care measures regarding COVID-19 infection    - Recommend that the patient self isolate in her home for a total of 5 days. Diagnosed with COVID-19 infection on September 30, 2023 and the earliest he would be released from self isolation would be on today. - Advised patient to contact the office should he develop any significant respiratory symptoms such as dyspnea on exertion or overt shortness of breath.           Encounter provider: Ricardo Burns DO Provider located at: [address] [id number]     Recent Visits  No visits were found meeting these conditions. Showing recent visits within past 7 days and meeting all other requirements  Today's Visits  Date Type Provider Dept   10/05/23 Telemedicine Becca Balderas DO Pg  [facility]   Showing today's visits and meeting all other requirements  Future Appointments  No visits were found meeting these conditions. Showing future appointments within next 150 days and meeting all other requirements     This virtual check-in was done via [employer] and patient was informed that this is a secure, HIPAA-compliant platform. She agrees to proceed. Patient agrees to participate in a virtual check in via telephone or video visit instead of presenting to the office to address urgent/immediate medical needs. Patient is aware this is a billable service. She acknowledged consent and understanding of privacy and security of the video platform. The patient has agreed to participate and understands they can discontinue the visit at any time. After connecting through [facility], the patient was identified by name and date of birth. Yon Obrien was informed that this was a telemedicine visit and that the exam was being conducted confidentially over secure lines. My office door was closed. No one else was in the room. Yon Obrien acknowledged consent and understanding of privacy and security of the telemedicine visit. I informed the patient that I have reviewed her record in Epic and presented the opportunity for her to ask any questions regarding the visit today. The patient agreed to participate. Verification of patient location:  Patient is located in the following state in which I hold an active license: [place]    Subjective:   Yon Obrien is a 79 y.o. female who is concerned about COVID-19.  Patient's symptoms include chills, fatigue, nasal congestion and cough. Patient denies fever, malaise, rhinorrhea, sore throat, anosmia, loss of taste, shortness of breath, chest tightness, abdominal pain, nausea, vomiting, diarrhea, myalgias and headaches. - Date of symptom onset: 9/29/2023      COVID-19 vaccination status: Fully vaccinated with booster    Exposure:   Contact with a person who is under investigation (PUI) for or who is positive for COVID-19 within the last 14 days?: No    Hospitalized recently for fever and/or lower respiratory symptoms?: No      Currently a healthcare worker that is involved in direct patient care?: No      Works in a special setting where the risk of COVID-19 transmission may be high? (this may include long-term care, correctional and residential facilities; homeless shelters; assisted-living facilities and group homes.): No      Resident in a special setting where the risk of COVID-19 transmission may be high? (this may include long-term care, correctional and residential facilities; homeless shelters; assisted-living facilities and group homes.): No      No results found for: "Thedore Sames", "[facility]", "Ree Antonio", "[facility]", "[facility]", "[address] [facility]"    Review of Systems   Constitutional: Positive for chills and fatigue. Negative for fever. HENT: Positive for congestion. Negative for rhinorrhea and sore throat. Respiratory: Positive for cough. Negative for chest tightness and shortness of breath. Gastrointestinal: Negative for abdominal pain, diarrhea, nausea and vomiting. Musculoskeletal: Negative for myalgias. Neurological: Negative for headaches.      Current Outpatient Medications on File Prior to Visit   Medication Sig   • bimatoprost (Lumigan) 0.01 % ophthalmic drops    • fluticasone (FLONASE) 50 mcg/act nasal spray 1 spray into each nostril daily   • loratadine (CLARITIN) 10 mg tablet Take 1 tablet (10 mg total) by mouth daily   • montelukast (SINGULAIR) 10 mg tablet Take 1 tablet (10 mg total) by mouth daily at bedtime   • multivitamin (THERAGRAN) TABS Take 1 tablet by mouth daily   • neomycin-polymyxin-dexamethasone (MAXITROL) ophthalmic suspension    • zolpidem (AMBIEN) 10 mg tablet Take 1 tablet (10 mg total) by mouth daily at bedtime as needed for sleep       Objective:    Ht 5' 6" (1.676 m)   Wt 102 kg (225 lb)   BMI 36.32 kg/m²        Physical Exam  Constitutional:       General: She is not in acute distress. Appearance: Normal appearance. She is well-developed and normal weight. She is not ill-appearing, toxic-appearing or diaphoretic. HENT:      Head: Normocephalic and atraumatic. Eyes:      Extraocular Movements: Extraocular movements intact. Pupils: Pupils are equal, round, and reactive to light. Pulmonary:      Effort: Pulmonary effort is normal.      Breath sounds: No wheezing. Neurological:      General: No focal deficit present. Mental Status: She is alert and oriented to person, place, and time. Psychiatric:         Mood and Affect: Mood normal.         Behavior: Behavior normal.         Thought Content:  Thought content normal.         Judgment: Judgment normal.       Ricardo Burns DO

## 2023-10-05 NOTE — TELEPHONE ENCOUNTER
Patient and her  are requesting for Paxlovid to see if PCP would recommend it for them. They have tested positive on Saturday 09/30. Symptoms of headache, sore throat, cough, diarrhea. They were out of town in [facility] when they returned and began feeling ill.

## 2023-10-05 NOTE — ASSESSMENT & PLAN NOTE
- Patient with mild COVID-19 symptoms initially diagnosed over 5 days ago. Very little benefit from antiviral treatment with Paxlovid    -Advised patient of CDC guidelines for self-isolation and when to end isolation. Patient could end isolation today but should wear a mask if she returns to work.   COVID-19 rapid antigen swab can be positive for several weeks

## 2023-12-26 ENCOUNTER — OFFICE VISIT (OUTPATIENT)
Dept: FAMILY MEDICINE CLINIC | Facility: CLINIC | Age: 68
End: 2023-12-26
Payer: MEDICARE

## 2023-12-26 VITALS
OXYGEN SATURATION: 95 % | RESPIRATION RATE: 16 BRPM | TEMPERATURE: 97.9 F | HEIGHT: 66 IN | WEIGHT: 232 LBS | DIASTOLIC BLOOD PRESSURE: 82 MMHG | SYSTOLIC BLOOD PRESSURE: 134 MMHG | BODY MASS INDEX: 37.28 KG/M2 | HEART RATE: 72 BPM

## 2023-12-26 DIAGNOSIS — J20.8 ACUTE BRONCHITIS DUE TO OTHER SPECIFIED ORGANISMS: Primary | ICD-10-CM

## 2023-12-26 PROCEDURE — 99213 OFFICE O/P EST LOW 20 MIN: CPT | Performed by: FAMILY MEDICINE

## 2023-12-26 RX ORDER — DEXTROMETHORPHAN HYDROBROMIDE AND PROMETHAZINE HYDROCHLORIDE 15; 6.25 MG/5ML; MG/5ML
5 SYRUP ORAL 4 TIMES DAILY PRN
Qty: 180 ML | Refills: 0 | Status: SHIPPED | OUTPATIENT
Start: 2023-12-26 | End: 2024-01-02

## 2023-12-26 RX ORDER — AZITHROMYCIN 250 MG/1
TABLET, FILM COATED ORAL DAILY
Qty: 6 TABLET | Refills: 0 | Status: SHIPPED | OUTPATIENT
Start: 2023-12-26 | End: 2023-12-31

## 2023-12-26 NOTE — ASSESSMENT & PLAN NOTE
- I believe that her symptoms are likely viral in nature however she has had symptoms for 10 days and is still having bronchitic breath sounds    -Patient will be placed on course of Zithromax and given prescription for Promethazine DM to take as needed for cough.    -Patient is not febrile or having any labored breathing.  No need for imaging at this time    -Follow-up if no improvement or resolution of symptoms

## 2023-12-26 NOTE — PROGRESS NOTES
Subjective:      Patient ID: Dasia Beckford is a 68 y.o. female.    68-year-old female presents with her  complaining of a 10-day history of cough productive of whitish, clear sputum.  Never had a fever.  No significant nasal congestion.  She did perform COVID-19 swab at home which was negative.    Cough  This is a new problem. The current episode started 1 to 4 weeks ago. The problem has been gradually improving. The problem occurs hourly. The cough is Non-productive. Associated symptoms include postnasal drip. Pertinent negatives include no chest pain, chills, ear congestion, ear pain, fever, headaches, heartburn, hemoptysis, myalgias, nasal congestion, rash, rhinorrhea, sore throat, shortness of breath, sweats, weight loss or wheezing.       Past Medical History:   Diagnosis Date   • Anemia    • Cortical age-related cataract of both eyes 08/06/2020   • Ovarian cyst     Right   • Raynaud phenomenon        Family History   Problem Relation Age of Onset   • Diabetes Mother    • Osteoarthritis Mother    • Diabetes Father    • Aneurysm Father    • Heart disease Brother    • Hyperlipidemia Brother    • Coronary artery disease Brother    • Other Brother         Brain tumor    • Other Brother    • Multiple myeloma Brother    • No Known Problems Paternal Aunt        Past Surgical History:   Procedure Laterality Date   • CAUTERIZE INNER NOSE     • REPLACEMENT TOTAL KNEE Bilateral     2012/2013        reports that she has never smoked. She has never used smokeless tobacco. She reports current alcohol use. She reports that she does not use drugs.      Current Outpatient Medications:   •  azithromycin (Zithromax) 250 mg tablet, Take 2 tablets (500 mg total) by mouth daily for 1 day, THEN 1 tablet (250 mg total) daily for 4 days., Disp: 6 tablet, Rfl: 0  •  bimatoprost (Lumigan) 0.01 % ophthalmic drops, , Disp: , Rfl:   •  fluticasone (FLONASE) 50 mcg/act nasal spray, 1 spray into each nostril daily, Disp: 11.1 mL,  "Rfl: 0  •  loratadine (CLARITIN) 10 mg tablet, Take 1 tablet (10 mg total) by mouth daily, Disp: 30 tablet, Rfl: 0  •  montelukast (SINGULAIR) 10 mg tablet, Take 1 tablet (10 mg total) by mouth daily at bedtime, Disp: 90 tablet, Rfl: 3  •  multivitamin (THERAGRAN) TABS, Take 1 tablet by mouth daily, Disp: , Rfl:   •  neomycin-polymyxin-dexamethasone (MAXITROL) ophthalmic suspension, , Disp: , Rfl:   •  promethazine-dextromethorphan (PHENERGAN-DM) 6.25-15 mg/5 mL oral syrup, Take 5 mL by mouth 4 (four) times a day as needed for cough, Disp: 180 mL, Rfl: 0  •  zolpidem (AMBIEN) 10 mg tablet, Take 1 tablet (10 mg total) by mouth daily at bedtime as needed for sleep, Disp: 90 tablet, Rfl: 1    The following portions of the patient's history were reviewed and updated as appropriate: allergies, current medications, past family history, past medical history, past social history, past surgical history and problem list.    Review of Systems   Constitutional:  Negative for chills, fever and weight loss.   HENT:  Positive for postnasal drip. Negative for ear pain, rhinorrhea and sore throat.    Respiratory:  Positive for cough. Negative for hemoptysis, shortness of breath and wheezing.    Cardiovascular:  Negative for chest pain.   Gastrointestinal:  Negative for heartburn.   Musculoskeletal:  Negative for myalgias.   Skin:  Negative for rash.   Neurological:  Negative for headaches.           Objective:    /82   Pulse 72   Temp 97.9 °F (36.6 °C) (Tympanic)   Resp 16   Ht 5' 6\" (1.676 m)   Wt 105 kg (232 lb)   SpO2 95%   BMI 37.45 kg/m²      Physical Exam  Vitals and nursing note reviewed.   Constitutional:       General: She is not in acute distress.     Appearance: She is well-developed.   HENT:      Head: Normocephalic and atraumatic.      Right Ear: Hearing, tympanic membrane, ear canal and external ear normal.      Left Ear: Hearing, tympanic membrane and external ear normal.      Nose: No nasal deformity, " septal deviation or mucosal edema.      Right Sinus: No maxillary sinus tenderness or frontal sinus tenderness.      Left Sinus: No maxillary sinus tenderness or frontal sinus tenderness.      Mouth/Throat:      Pharynx: No oropharyngeal exudate.   Eyes:      Conjunctiva/sclera: Conjunctivae normal.      Pupils: Pupils are equal, round, and reactive to light.   Cardiovascular:      Rate and Rhythm: Normal rate and regular rhythm.   Pulmonary:      Effort: Pulmonary effort is normal. No accessory muscle usage or respiratory distress.      Breath sounds: Rhonchi (Right upper lobe) present. No wheezing or rales.   Musculoskeletal:      Cervical back: Normal range of motion and neck supple.           No results found for this or any previous visit (from the past 1008 hour(s)).    Assessment/Plan:    Acute bronchitis due to other specified organisms  - I believe that her symptoms are likely viral in nature however she has had symptoms for 10 days and is still having bronchitic breath sounds    -Patient will be placed on course of Zithromax and given prescription for Promethazine DM to take as needed for cough.    -Patient is not febrile or having any labored breathing.  No need for imaging at this time    -Follow-up if no improvement or resolution of symptoms          Problem List Items Addressed This Visit        Respiratory    Acute bronchitis due to other specified organisms - Primary     - I believe that her symptoms are likely viral in nature however she has had symptoms for 10 days and is still having bronchitic breath sounds    -Patient will be placed on course of Zithromax and given prescription for Promethazine DM to take as needed for cough.    -Patient is not febrile or having any labored breathing.  No need for imaging at this time    -Follow-up if no improvement or resolution of symptoms         Relevant Medications    azithromycin (Zithromax) 250 mg tablet    promethazine-dextromethorphan (PHENERGAN-DM)  6.25-15 mg/5 mL oral syrup

## 2024-01-02 ENCOUNTER — OFFICE VISIT (OUTPATIENT)
Dept: FAMILY MEDICINE CLINIC | Facility: CLINIC | Age: 69
End: 2024-01-02
Payer: MEDICARE

## 2024-01-02 ENCOUNTER — TELEPHONE (OUTPATIENT)
Age: 69
End: 2024-01-02

## 2024-01-02 VITALS
RESPIRATION RATE: 16 BRPM | OXYGEN SATURATION: 100 % | HEART RATE: 71 BPM | SYSTOLIC BLOOD PRESSURE: 136 MMHG | BODY MASS INDEX: 37.45 KG/M2 | TEMPERATURE: 97.6 F | WEIGHT: 233 LBS | DIASTOLIC BLOOD PRESSURE: 98 MMHG | HEIGHT: 66 IN

## 2024-01-02 DIAGNOSIS — R05.2 SUBACUTE COUGH: ICD-10-CM

## 2024-01-02 DIAGNOSIS — J20.8 ACUTE BRONCHITIS DUE TO OTHER SPECIFIED ORGANISMS: ICD-10-CM

## 2024-01-02 DIAGNOSIS — J20.8 ACUTE BRONCHITIS DUE TO OTHER SPECIFIED ORGANISMS: Primary | ICD-10-CM

## 2024-01-02 PROBLEM — U07.1 COVID-19: Status: RESOLVED | Noted: 2023-10-05 | Resolved: 2024-01-02

## 2024-01-02 PROBLEM — R05.9 COUGH: Status: ACTIVE | Noted: 2024-01-02

## 2024-01-02 PROCEDURE — 99213 OFFICE O/P EST LOW 20 MIN: CPT | Performed by: FAMILY MEDICINE

## 2024-01-02 NOTE — PROGRESS NOTES
Subjective:      Patient ID: Dasia Beckford is a 68 y.o. female.    68-year-old female presents with her  for follow-up in regards to acute bronchitis and persistent cough.  Cough is minimally productive.  Slightly better than it was when she was and on December 26.  She did finish course of antibiotics and did use Promethazine DM cough syrup.  She will getting coughing fits.  No overt shortness of breath.  No fever    Cough  This is a recurrent problem. The current episode started 1 to 4 weeks ago. The problem has been waxing and waning. The problem occurs hourly. The cough is Non-productive and productive of sputum. Associated symptoms include postnasal drip. Pertinent negatives include no chest pain, chills, ear congestion, ear pain, fever, headaches, heartburn, hemoptysis, myalgias, nasal congestion, rash, rhinorrhea, sore throat, shortness of breath, sweats, weight loss or wheezing. Nothing aggravates the symptoms.       Past Medical History:   Diagnosis Date   • Anemia    • Cortical age-related cataract of both eyes 08/06/2020   • Ovarian cyst     Right   • Raynaud phenomenon        Family History   Problem Relation Age of Onset   • Diabetes Mother    • Osteoarthritis Mother    • Diabetes Father    • Aneurysm Father    • Heart disease Brother    • Hyperlipidemia Brother    • Coronary artery disease Brother    • Other Brother         Brain tumor    • Other Brother    • Multiple myeloma Brother    • No Known Problems Paternal Aunt        Past Surgical History:   Procedure Laterality Date   • CAUTERIZE INNER NOSE     • REPLACEMENT TOTAL KNEE Bilateral     2012/2013        reports that she has never smoked. She has never used smokeless tobacco. She reports current alcohol use. She reports that she does not use drugs.      Current Outpatient Medications:   •  bimatoprost (Lumigan) 0.01 % ophthalmic drops, , Disp: , Rfl:   •  fluticasone (FLONASE) 50 mcg/act nasal spray, 1 spray into each nostril daily,  "Disp: 11.1 mL, Rfl: 0  •  fluticasone-umeclidinium-vilanterol 100-62.5-25 mcg/actuation inhaler, Inhale 1 puff daily Rinse mouth after use., Disp: 1 each, Rfl: 0  •  loratadine (CLARITIN) 10 mg tablet, Take 1 tablet (10 mg total) by mouth daily, Disp: 30 tablet, Rfl: 0  •  montelukast (SINGULAIR) 10 mg tablet, Take 1 tablet (10 mg total) by mouth daily at bedtime, Disp: 90 tablet, Rfl: 3  •  multivitamin (THERAGRAN) TABS, Take 1 tablet by mouth daily, Disp: , Rfl:   •  neomycin-polymyxin-dexamethasone (MAXITROL) ophthalmic suspension, , Disp: , Rfl:   •  pseudoephedrine-codeine-guaifenesin (MYTUSSIN DAC) 30- MG/5ML solution, Take 10 mL by mouth 4 (four) times a day as needed for cough, Disp: 120 mL, Rfl: 0  •  zolpidem (AMBIEN) 10 mg tablet, Take 1 tablet (10 mg total) by mouth daily at bedtime as needed for sleep, Disp: 90 tablet, Rfl: 1    The following portions of the patient's history were reviewed and updated as appropriate: allergies, current medications, past family history, past medical history, past social history, past surgical history and problem list.    Review of Systems   Constitutional:  Negative for chills, fever and weight loss.   HENT:  Positive for postnasal drip. Negative for ear pain, rhinorrhea and sore throat.    Respiratory:  Positive for cough. Negative for hemoptysis, shortness of breath and wheezing.    Cardiovascular:  Negative for chest pain.   Gastrointestinal:  Negative for heartburn.   Musculoskeletal:  Negative for myalgias.   Skin:  Negative for rash.   Neurological:  Negative for headaches.           Objective:    /98 (BP Location: Right arm, Patient Position: Sitting, Cuff Size: Large)   Pulse 71   Temp 97.6 °F (36.4 °C)   Resp 16   Ht 5' 6\" (1.676 m)   Wt 106 kg (233 lb)   SpO2 100%   BMI 37.61 kg/m²      Physical Exam  Vitals and nursing note reviewed.   Constitutional:       General: She is not in acute distress.     Appearance: She is well-developed.   HENT: "      Head: Normocephalic and atraumatic.      Right Ear: Hearing normal.      Left Ear: Hearing normal.      Nose: Nose normal. No nasal deformity, septal deviation or mucosal edema.      Right Sinus: No maxillary sinus tenderness or frontal sinus tenderness.      Left Sinus: No maxillary sinus tenderness or frontal sinus tenderness.      Mouth/Throat:      Mouth: Mucous membranes are moist.      Pharynx: Oropharynx is clear. No oropharyngeal exudate.   Eyes:      Conjunctiva/sclera: Conjunctivae normal.      Pupils: Pupils are equal, round, and reactive to light.   Cardiovascular:      Rate and Rhythm: Normal rate and regular rhythm.   Pulmonary:      Effort: Pulmonary effort is normal. No accessory muscle usage or respiratory distress.      Breath sounds: No wheezing, rhonchi (Right upper lobe) or rales.      Comments: Minimal dry cough  Musculoskeletal:      Cervical back: Normal range of motion and neck supple.           No results found for this or any previous visit (from the past 1008 hour(s)).    Assessment/Plan:    Acute bronchitis due to other specified organisms  Once again explained to the patient that she has a viral cause of symptoms and likely RSV.  Cough can linger for some time    -Afebrile, no wheezing, no labored breathing    -Patient given codeine-based cough syrup and also given sample of Trelegy Ellipta inhaler.  Given instructions on use    Cough  Related to viral bronchitis.  This is progressively improving but she is still having dry cough.  I explained that this can take some time before cough will finally resolve.  No need for imaging.          Problem List Items Addressed This Visit        Respiratory    Acute bronchitis due to other specified organisms - Primary     Once again explained to the patient that she has a viral cause of symptoms and likely RSV.  Cough can linger for some time    -Afebrile, no wheezing, no labored breathing    -Patient given codeine-based cough syrup and also  given sample of Trelegy Ellipta inhaler.  Given instructions on use         Relevant Medications    pseudoephedrine-codeine-guaifenesin (MYTUSSIN DAC) 30- MG/5ML solution    fluticasone-umeclidinium-vilanterol 100-62.5-25 mcg/actuation inhaler       Other    Cough     Related to viral bronchitis.  This is progressively improving but she is still having dry cough.  I explained that this can take some time before cough will finally resolve.  No need for imaging.         Relevant Medications    pseudoephedrine-codeine-guaifenesin (MYTUSSIN DAC) 30- MG/5ML solution    fluticasone-umeclidinium-vilanterol 100-62.5-25 mcg/actuation inhaler

## 2024-01-02 NOTE — ASSESSMENT & PLAN NOTE
Once again explained to the patient that she has a viral cause of symptoms and likely RSV.  Cough can linger for some time    -Afebrile, no wheezing, no labored breathing    -Patient given codeine-based cough syrup and also given sample of Trelegy Ellipta inhaler.  Given instructions on use

## 2024-01-02 NOTE — ASSESSMENT & PLAN NOTE
Related to viral bronchitis.  This is progressively improving but she is still having dry cough.  I explained that this can take some time before cough will finally resolve.  No need for imaging.

## 2024-01-02 NOTE — TELEPHONE ENCOUNTER
[place] at [facility] ph# [phone] stated that they don't have pseudoephedrine-codeine-guaifenesin (MYTUSSIN DAC) 30- MG/5ML solution.       What is an alternative?        Please contact [facility] and advise. Thank you for your assistance.

## 2024-01-03 ENCOUNTER — TELEPHONE (OUTPATIENT)
Age: 69
End: 2024-01-03

## 2024-01-03 DIAGNOSIS — J20.8 ACUTE BRONCHITIS DUE TO OTHER SPECIFIED ORGANISMS: ICD-10-CM

## 2024-01-03 DIAGNOSIS — R05.2 SUBACUTE COUGH: ICD-10-CM

## 2024-01-03 RX ORDER — CODEINE PHOSPHATE/GUAIFENESIN 10-100MG/5
5 LIQUID (ML) ORAL 3 TIMES DAILY PRN
Qty: 180 ML | Refills: 0 | Status: SHIPPED | OUTPATIENT
Start: 2024-01-03

## 2024-01-03 NOTE — TELEPHONE ENCOUNTER
Please call the pharmacy and find what they have in stock that is an equivalent.  I do not know what they have in stock

## 2024-01-03 NOTE — TELEPHONE ENCOUNTER
Pharmacy calling - they do not have Mytussin DAC. They have guaifenesin with codeine but it does not include the pseudoephedrine. Please call in new script to [facility].

## 2024-02-21 PROBLEM — Z01.419 ENCOUNTER FOR ANNUAL ROUTINE GYNECOLOGICAL EXAMINATION: Status: RESOLVED | Noted: 2019-03-07 | Resolved: 2024-02-21

## 2024-02-21 PROBLEM — J20.8 ACUTE BRONCHITIS DUE TO OTHER SPECIFIED ORGANISMS: Status: RESOLVED | Noted: 2023-12-26 | Resolved: 2024-02-21

## 2024-02-21 PROBLEM — Z01.419 WELL FEMALE EXAM WITH ROUTINE GYNECOLOGICAL EXAM: Status: RESOLVED | Noted: 2018-03-08 | Resolved: 2024-02-21

## 2024-02-21 PROBLEM — Z13.820 SCREENING FOR OSTEOPOROSIS: Status: RESOLVED | Noted: 2020-06-01 | Resolved: 2024-02-21

## 2024-02-21 PROBLEM — R05.9 COUGH: Status: RESOLVED | Noted: 2024-01-02 | Resolved: 2024-02-21

## 2024-03-08 LAB
1,25(OH)2D SERPL-MCNC: 44 PG/ML (ref 18–72)
1,25(OH)2D2 SERPL-MCNC: <8 PG/ML
1,25(OH)2D3 SERPL-MCNC: 44 PG/ML
BASOPHILS # BLD AUTO: 61 CELLS/UL (ref 0–200)
BASOPHILS NFR BLD AUTO: 1.3 %
EOSINOPHIL # BLD AUTO: 132 CELLS/UL (ref 15–500)
EOSINOPHIL NFR BLD AUTO: 2.8 %
ERYTHROCYTE [DISTWIDTH] IN BLOOD BY AUTOMATED COUNT: 13.1 % (ref 11–15)
FERRITIN SERPL-MCNC: 40 NG/ML (ref 16–288)
HCT VFR BLD AUTO: 41.5 % (ref 35–45)
HGB BLD-MCNC: 13.5 G/DL (ref 11.7–15.5)
IRON SATN MFR SERPL: 29 % (CALC) (ref 16–45)
IRON SERPL-MCNC: 89 MCG/DL (ref 45–160)
LYMPHOCYTES # BLD AUTO: 1800 CELLS/UL (ref 850–3900)
LYMPHOCYTES NFR BLD AUTO: 38.3 %
MCH RBC QN AUTO: 29.5 PG (ref 27–33)
MCHC RBC AUTO-ENTMCNC: 32.5 G/DL (ref 32–36)
MCV RBC AUTO: 90.8 FL (ref 80–100)
MONOCYTES # BLD AUTO: 489 CELLS/UL (ref 200–950)
MONOCYTES NFR BLD AUTO: 10.4 %
NEUTROPHILS # BLD AUTO: 2218 CELLS/UL (ref 1500–7800)
NEUTROPHILS NFR BLD AUTO: 47.2 %
PLATELET # BLD AUTO: 186 THOUSAND/UL (ref 140–400)
PMV BLD REES-ECKER: 11.8 FL (ref 7.5–12.5)
RBC # BLD AUTO: 4.57 MILLION/UL (ref 3.8–5.1)
TIBC SERPL-MCNC: 302 MCG/DL (CALC) (ref 250–450)
TSH SERPL-ACNC: 3.75 MIU/L (ref 0.4–4.5)
WBC # BLD AUTO: 4.7 THOUSAND/UL (ref 3.8–10.8)

## 2024-03-18 ENCOUNTER — OFFICE VISIT (OUTPATIENT)
Dept: FAMILY MEDICINE CLINIC | Facility: CLINIC | Age: 69
End: 2024-03-18
Payer: MEDICARE

## 2024-03-18 VITALS
RESPIRATION RATE: 16 BRPM | HEIGHT: 66 IN | WEIGHT: 232 LBS | SYSTOLIC BLOOD PRESSURE: 136 MMHG | OXYGEN SATURATION: 96 % | DIASTOLIC BLOOD PRESSURE: 84 MMHG | BODY MASS INDEX: 37.28 KG/M2 | HEART RATE: 68 BPM

## 2024-03-18 DIAGNOSIS — E78.5 BORDERLINE HYPERLIPIDEMIA: ICD-10-CM

## 2024-03-18 DIAGNOSIS — Z12.31 ENCOUNTER FOR SCREENING MAMMOGRAM FOR MALIGNANT NEOPLASM OF BREAST: ICD-10-CM

## 2024-03-18 DIAGNOSIS — Z00.00 WELCOME TO MEDICARE PREVENTIVE VISIT: Primary | ICD-10-CM

## 2024-03-18 DIAGNOSIS — D50.9 HYPOCHROMIC MICROCYTIC ANEMIA: ICD-10-CM

## 2024-03-18 DIAGNOSIS — F51.01 PRIMARY INSOMNIA: ICD-10-CM

## 2024-03-18 DIAGNOSIS — E55.9 VITAMIN D DEFICIENCY: ICD-10-CM

## 2024-03-18 DIAGNOSIS — J30.2 SEASONAL ALLERGIES: ICD-10-CM

## 2024-03-18 PROCEDURE — G0402 INITIAL PREVENTIVE EXAM: HCPCS | Performed by: FAMILY MEDICINE

## 2024-03-18 RX ORDER — MONTELUKAST SODIUM 10 MG/1
10 TABLET ORAL
Qty: 90 TABLET | Refills: 3 | Status: SHIPPED | OUTPATIENT
Start: 2024-03-18

## 2024-03-18 RX ORDER — ZOLPIDEM TARTRATE 10 MG/1
10 TABLET ORAL
Qty: 90 TABLET | Refills: 1 | Status: SHIPPED | OUTPATIENT
Start: 2024-03-18

## 2024-03-18 NOTE — PATIENT INSTRUCTIONS
Medicare Preventive Visit Patient Instructions  Thank you for completing your Welcome to Medicare Visit or Medicare Annual Wellness Visit today. Your next wellness visit will be due in one year (3/19/2025).  The screening/preventive services that you may require over the next 5-10 years are detailed below. Some tests may not apply to you based off risk factors and/or age. Screening tests ordered at today's visit but not completed yet may show as past due. Also, please note that scanned in results may not display below.  Preventive Screenings:  Service Recommendations Previous Testing/Comments   Colorectal Cancer Screening  * Colonoscopy    * Fecal Occult Blood Test (FOBT)/Fecal Immunochemical Test (FIT)  * Fecal DNA/Cologuard Test  * Flexible Sigmoidoscopy Age: 45-75 years old   Colonoscopy: every 10 years (may be performed more frequently if at higher risk)  OR  FOBT/FIT: every 1 year  OR  Cologuard: every 3 years  OR  Sigmoidoscopy: every 5 years  Screening may be recommended earlier than age 45 if at higher risk for colorectal cancer. Also, an individualized decision between you and your healthcare provider will decide whether screening between the ages of 76-85 would be appropriate. Colonoscopy: 04/07/2015  FOBT/FIT: Not on file  Cologuard: Not on file  Sigmoidoscopy: Not on file    Screening Current     Breast Cancer Screening Age: 40+ years old  Frequency: every 1-2 years  Not required if history of left and right mastectomy Mammogram: 02/16/2023    Screening Current   Cervical Cancer Screening Between the ages of 21-29, pap smear recommended once every 3 years.   Between the ages of 30-65, can perform pap smear with HPV co-testing every 5 years.   Recommendations may differ for women with a history of total hysterectomy, cervical cancer, or abnormal pap smears in past. Pap Smear: 09/07/2022    Screening Not Indicated   Hepatitis C Screening Once for adults born between 1945 and 1965  More frequently in  patients at high risk for Hepatitis C Hep C Antibody: 04/18/2020    Screening Current   Diabetes Screening 1-2 times per year if you're at risk for diabetes or have pre-diabetes Fasting glucose: No results in last 5 years (No results in last 5 years)  A1C: No results in last 5 years (No results in last 5 years)  Screening Current   Cholesterol Screening Once every 5 years if you don't have a lipid disorder. May order more often based on risk factors. Lipid panel: 08/25/2023    Screening Current     Other Preventive Screenings Covered by Medicare:  Abdominal Aortic Aneurysm (AAA) Screening: covered once if your at risk. You're considered to be at risk if you have a family history of AAA.  Lung Cancer Screening: covers low dose CT scan once per year if you meet all of the following conditions: (1) Age 55-77; (2) No signs or symptoms of lung cancer; (3) Current smoker or have quit smoking within the last 15 years; (4) You have a tobacco smoking history of at least 20 pack years (packs per day multiplied by number of years you smoked); (5) You get a written order from a healthcare provider.  Glaucoma Screening: covered annually if you're considered high risk: (1) You have diabetes OR (2) Family history of glaucoma OR (3)  aged 50 and older OR (4)  American aged 65 and older  Osteoporosis Screening: covered every 2 years if you meet one of the following conditions: (1) You're estrogen deficient and at risk for osteoporosis based off medical history and other findings; (2) Have a vertebral abnormality; (3) On glucocorticoid therapy for more than 3 months; (4) Have primary hyperparathyroidism; (5) On osteoporosis medications and need to assess response to drug therapy.   Last bone density test (DXA Scan): 06/09/2020.  HIV Screening: covered annually if you're between the age of 15-65. Also covered annually if you are younger than 15 and older than 65 with risk factors for HIV infection. For pregnant  patients, it is covered up to 3 times per pregnancy.    Immunizations:  Immunization Recommendations   Influenza Vaccine Annual influenza vaccination during flu season is recommended for all persons aged >= 6 months who do not have contraindications   Pneumococcal Vaccine   * Pneumococcal conjugate vaccine = PCV13 (Prevnar 13), PCV15 (Vaxneuvance), PCV20 (Prevnar 20)  * Pneumococcal polysaccharide vaccine = PPSV23 (Pneumovax) Adults 19-65 yo with certain risk factors or if 65+ yo  If never received any pneumonia vaccine: recommend Prevnar 20 (PCV20)  Give PCV20 if previously received 1 dose of PCV13 or PPSV23   Hepatitis B Vaccine 3 dose series if at intermediate or high risk (ex: diabetes, end stage renal disease, liver disease)   Respiratory syncytial virus (RSV) Vaccine - COVERED BY MEDICARE PART D  * RSVPreF3 (Arexvy) CDC recommends that adults 60 years of age and older may receive a single dose of RSV vaccine using shared clinical decision-making (SCDM)   Tetanus (Td) Vaccine - COST NOT COVERED BY MEDICARE PART B Following completion of primary series, a booster dose should be given every 10 years to maintain immunity against tetanus. Td may also be given as tetanus wound prophylaxis.   Tdap Vaccine - COST NOT COVERED BY MEDICARE PART B Recommended at least once for all adults. For pregnant patients, recommended with each pregnancy.   Shingles Vaccine (Shingrix) - COST NOT COVERED BY MEDICARE PART B  2 shot series recommended in those 19 years and older who have or will have weakened immune systems or those 50 years and older     Health Maintenance Due:      Topic Date Due   • Breast Cancer Screening: Mammogram  02/16/2024   • Colorectal Cancer Screening  04/07/2025   • Cervical Cancer Screening  09/07/2025   • Hepatitis C Screening  Completed     Immunizations Due:      Topic Date Due   • COVID-19 Vaccine (6 - 2023-24 season) 09/01/2023     Advance Directives   What are advance directives?  Advance directives  are legal documents that state your wishes and plans for medical care. These plans are made ahead of time in case you lose your ability to make decisions for yourself. Advance directives can apply to any medical decision, such as the treatments you want, and if you want to donate organs.   What are the types of advance directives?  There are many types of advance directives, and each state has rules about how to use them. You may choose a combination of any of the following:  Living will:  This is a written record of the treatment you want. You can also choose which treatments you do not want, which to limit, and which to stop at a certain time. This includes surgery, medicine, IV fluid, and tube feedings.   Durable power of  for healthcare (DPAHC):  This is a written record that states who you want to make healthcare choices for you when you are unable to make them for yourself. This person, called a proxy, is usually a family member or a friend. You may choose more than 1 proxy.  Do not resuscitate (DNR) order:  A DNR order is used in case your heart stops beating or you stop breathing. It is a request not to have certain forms of treatment, such as CPR. A DNR order may be included in other types of advance directives.  Medical directive:  This covers the care that you want if you are in a coma, near death, or unable to make decisions for yourself. You can list the treatments you want for each condition. Treatment may include pain medicine, surgery, blood transfusions, dialysis, IV or tube feedings, and a ventilator (breathing machine).  Values history:  This document has questions about your views, beliefs, and how you feel and think about life. This information can help others choose the care that you would choose.  Why are advance directives important?  An advance directive helps you control your care. Although spoken wishes may be used, it is better to have your wishes written down. Spoken wishes can  be misunderstood, or not followed. Treatments may be given even if you do not want them. An advance directive may make it easier for your family to make difficult choices about your care.   Weight Management   Why it is important to manage your weight:  Being overweight increases your risk of health conditions such as heart disease, high blood pressure, type 2 diabetes, and certain types of cancer. It can also increase your risk for osteoarthritis, sleep apnea, and other respiratory problems. Aim for a slow, steady weight loss. Even a small amount of weight loss can lower your risk of health problems.  How to lose weight safely:  A safe and healthy way to lose weight is to eat fewer calories and get regular exercise. You can lose up about 1 pound a week by decreasing the number of calories you eat by 500 calories each day.   Healthy meal plan for weight management:  A healthy meal plan includes a variety of foods, contains fewer calories, and helps you stay healthy. A healthy meal plan includes the following:  Eat whole-grain foods more often.  A healthy meal plan should contain fiber. Fiber is the part of grains, fruits, and vegetables that is not broken down by your body. Whole-grain foods are healthy and provide extra fiber in your diet. Some examples of whole-grain foods are whole-wheat breads and pastas, oatmeal, brown rice, and bulgur.  Eat a variety of vegetables every day.  Include dark, leafy greens such as spinach, kale, carlito greens, and mustard greens. Eat yellow and orange vegetables such as carrots, sweet potatoes, and winter squash.   Eat a variety of fruits every day.  Choose fresh or canned fruit (canned in its own juice or light syrup) instead of juice. Fruit juice has very little or no fiber.  Eat low-fat dairy foods.  Drink fat-free (skim) milk or 1% milk. Eat fat-free yogurt and low-fat cottage cheese. Try low-fat cheeses such as mozzarella and other reduced-fat cheeses.  Choose meat and other  protein foods that are low in fat.  Choose beans or other legumes such as split peas or lentils. Choose fish, skinless poultry (chicken or turkey), or lean cuts of red meat (beef or pork). Before you cook meat or poultry, cut off any visible fat.   Use less fat and oil.  Try baking foods instead of frying them. Add less fat, such as margarine, sour cream, regular salad dressing and mayonnaise to foods. Eat fewer high-fat foods. Some examples of high-fat foods include french fries, doughnuts, ice cream, and cakes.  Eat fewer sweets.  Limit foods and drinks that are high in sugar. This includes candy, cookies, regular soda, and sweetened drinks.  Exercise:  Exercise at least 30 minutes per day on most days of the week. Some examples of exercise include walking, biking, dancing, and swimming. You can also fit in more physical activity by taking the stairs instead of the elevator or parking farther away from stores. Ask your healthcare provider about the best exercise plan for you.      © Copyright [employer] 2018 Information is for End User's use only and may not be sold, redistributed or otherwise used for commercial purposes. All illustrations and images included in CareNotes® are the copyrighted property of [employer] or [employer]

## 2024-03-18 NOTE — ASSESSMENT & PLAN NOTE
patient remains on over-the-counter iron supplementation.  Iron studies are therapeutic and she is not anemic

## 2024-03-18 NOTE — ASSESSMENT & PLAN NOTE
Patient continues doing well with as needed Ambien.  She has been on this for regular basis.  Refill provided.    Prior to prescribing the controlled substance, a patient search was performed on the [place] prescription drug monitoring program web site.  There was no evidence of diversion or misuse.  Prescription provided

## 2024-03-18 NOTE — PROGRESS NOTES
Assessment and Plan:     Problem List Items Addressed This Visit        Blood    Hypochromic microcytic anemia      patient remains on over-the-counter iron supplementation.  Iron studies are therapeutic and she is not anemic         Relevant Orders    CBC and differential       Obstetrics/Gynecology    Encounter for screening mammogram for malignant neoplasm of breast     Screening mammogram ordered         Relevant Orders    Mammo screening bilateral w 3d & cad       Neurology/Sleep    Primary insomnia     Patient continues doing well with as needed Ambien.  She has been on this for regular basis.  Refill provided.    Prior to prescribing the controlled substance, a patient search was performed on the [place] prescription drug monitoring program web site.  There was no evidence of diversion or misuse.  Prescription provided           Relevant Medications    zolpidem (AMBIEN) 10 mg tablet       Other    Seasonal allergies     Has been very well-controlled on Singulair 10 mg once daily.  Refill provided         Relevant Medications    montelukast (SINGULAIR) 10 mg tablet    Vitamin D deficiency     Therapeutic vitamin D level on over-the-counter supplementation         Relevant Orders    Vitamin D 25 hydroxy    Welcome to Medicare preventive visit - Primary     Welcome to Medicare physical examination performed        Other Visit Diagnoses     Borderline hyperlipidemia        Relevant Orders    Comprehensive metabolic panel    Lipid panel            Depression Screening and Follow-up Plan: Patient was screened for depression during today's encounter. They screened negative with a PHQ-2 score of 0.      Preventive health issues were discussed with patient, and age appropriate screening tests were ordered as noted in patient's After Visit Summary.  Personalized health advice and appropriate referrals for health education or preventive services given if needed, as noted in patient's After Visit Summary.      History of Present Illness:     Patient presents for a Medicare Wellness Visit    68-year-old female for welcome to Medicare physical examination.  No particular complaints or concerns.  Patient is happily now retired.  She does have order for DEXA scan that was ordered by gynecologist.  Labs reviewed which showed normal TSH, iron studies, normalized vitamin D level and normal CBC with hemoglobin at 13.5       Patient Care Team:  Delbert Meyers DO as PCP - General (Family Medicine)  Leatha Gastelum DO (Obstetrics and Gynecology)  Toi Frey MD (Gastroenterology)     Review of Systems:     Review of Systems   Constitutional: Negative.    HENT: Negative.     Eyes: Negative.    Respiratory: Negative.     Cardiovascular: Negative.    Gastrointestinal: Negative.    Endocrine: Negative.    Genitourinary: Negative.    Musculoskeletal: Negative.    Skin: Negative.    Allergic/Immunologic: Negative.    Neurological: Negative.    Hematological: Negative.    Psychiatric/Behavioral: Negative.          Problem List:     Patient Active Problem List   Diagnosis   • Raynaud's disease without gangrene   • Vitamin D deficiency   • Family history of brain cancer   • Family history of aneurysm   • Flu vaccine need   • Encounter for screening mammogram for malignant neoplasm of breast   • Primary insomnia   • Pelvic fullness in female   • Recurrent epistaxis   • Need for prophylactic vaccination against diphtheria and tetanus   • Seasonal allergies   • Bladder dysfunction   • Hypochromic microcytic anemia   • Need for shingles vaccine   • Welcome to Medicare preventive visit      Past Medical and Surgical History:     Past Medical History:   Diagnosis Date   • Anemia    • Cortical age-related cataract of both eyes 08/06/2020   • Ovarian cyst     Right   • Raynaud phenomenon      Past Surgical History:   Procedure Laterality Date   • CAUTERIZE INNER NOSE     • REPLACEMENT TOTAL KNEE Bilateral     2012/2013      Family History:      Family History   Problem Relation Age of Onset   • Diabetes Mother    • Osteoarthritis Mother    • Diabetes Father    • Aneurysm Father    • Heart disease Brother    • Hyperlipidemia Brother    • Coronary artery disease Brother    • Other Brother         Brain tumor    • Other Brother    • Multiple myeloma Brother    • No Known Problems Paternal Aunt       Social History:     Social History     Socioeconomic History   • Marital status: /Civil Union     Spouse name: None   • Number of children: None   • Years of education: None   • Highest education level: None   Occupational History   • None   Tobacco Use   • Smoking status: Never   • Smokeless tobacco: Never   Vaping Use   • Vaping status: Never Used   Substance and Sexual Activity   • Alcohol use: Yes     Comment: occasional   • Drug use: No   • Sexual activity: Not Currently     Partners: Male     Birth control/protection: None   Other Topics Concern   • None   Social History Narrative     , lives with      Social Determinants of Health     Financial Resource Strain: Not on file   Food Insecurity: No Food Insecurity (3/18/2024)    Hunger Vital Sign    • Worried About Running Out of Food in the Last Year: Never true    • Ran Out of Food in the Last Year: Never true   Transportation Needs: No Transportation Needs (3/18/2024)    PRAPARE - Transportation    • Lack of Transportation (Medical): No    • Lack of Transportation (Non-Medical): No   Physical Activity: Not on file   Stress: Not on file   Social Connections: Not on file   Intimate Partner Violence: Not on file   Housing Stability: Low Risk  (3/18/2024)    Housing Stability Vital Sign    • Unable to Pay for Housing in the Last Year: No    • Number of Places Lived in the Last Year: 1    • Unstable Housing in the Last Year: No      Medications and Allergies:     Current Outpatient Medications   Medication Sig Dispense Refill   • bimatoprost (Lumigan) 0.01 % ophthalmic drops      •  fluticasone (FLONASE) 50 mcg/act nasal spray 1 spray into each nostril daily 11.1 mL 0   • loratadine (CLARITIN) 10 mg tablet Take 1 tablet (10 mg total) by mouth daily 30 tablet 0   • montelukast (SINGULAIR) 10 mg tablet Take 1 tablet (10 mg total) by mouth daily at bedtime 90 tablet 3   • multivitamin (THERAGRAN) TABS Take 1 tablet by mouth daily     • neomycin-polymyxin-dexamethasone (MAXITROL) ophthalmic suspension      • zolpidem (AMBIEN) 10 mg tablet Take 1 tablet (10 mg total) by mouth daily at bedtime as needed for sleep 90 tablet 1     No current facility-administered medications for this visit.     No Known Allergies   Immunizations:     Immunization History   Administered Date(s) Administered   • COVID-19 PFIZER VACCINE 0.3 ML IM 03/17/2021, 04/10/2021, 10/12/2021   • COVID-19 Pfizer Vac BIVALENT Anil-sucrose 12 Yr+ IM 09/09/2022   • COVID-19 Pfizer vac (Anil-sucrose, gray cap) 12 yr+ IM 04/12/2022   • INFLUENZA 09/12/2020   • Influenza, high dose seasonal 0.7 mL 09/05/2022, 09/08/2023   • Influenza, injectable, quadrivalent, preservative free 0.5 mL 10/12/2019   • Influenza, recombinant, quadrivalent,injectable, preservative free 10/04/2018, 09/17/2021   • Pneumococcal Conjugate Vaccine 20-valent (Pcv20), Polysace 07/21/2022   • Tdap 06/01/2020   • Zoster Vaccine Recombinant 10/06/2022, 02/10/2023      Health Maintenance:         Topic Date Due   • Breast Cancer Screening: Mammogram  02/16/2024   • Colorectal Cancer Screening  04/07/2025   • Cervical Cancer Screening  09/07/2025   • Hepatitis C Screening  Completed         Topic Date Due   • COVID-19 Vaccine (6 - 2023-24 season) 09/01/2023      Medicare Screening Tests and Risk Assessments:     Dasia is here for her Welcome to Medicare visit.     Health Risk Assessment:   Patient rates overall health as very good. Patient feels that their physical health rating is same. Patient is satisfied with their life. Eyesight was rated as same. Hearing was rated as  same. Patient feels that their emotional and mental health rating is same. Patients states they are never, rarely angry. Patient states they are sometimes unusually tired/fatigued. Pain experienced in the last 7 days has been some. Patient's pain rating has been 5/10. Patient states that she has experienced no weight loss or gain in last 6 months.     Depression Screening:   PHQ-2 Score: 0      Fall Risk Screening:   In the past year, patient has experienced: no history of falling in past year      Urinary Incontinence Screening:   Patient has not leaked urine accidently in the last six months.     Home Safety:  Patient has trouble with stairs inside or outside of their home. Patient has working smoke alarms and has working carbon monoxide detector. Home safety hazards include: none.     Medications:   Patient is currently taking over-the-counter supplements. OTC medications include: see medication list. Patient is able to manage medications.     Activities of Daily Living (ADLs)/Instrumental Activities of Daily Living (IADLs):   Walk and transfer into and out of bed and chair?: Yes  Dress and groom yourself?: Yes    Bathe or shower yourself?: Yes    Feed yourself? Yes  Do your laundry/housekeeping?: Yes  Manage your money, pay your bills and track your expenses?: Yes  Make your own meals?: Yes    Do your own shopping?: Yes    Previous Hospitalizations:   Any hospitalizations or ED visits within the last 12 months?: No      Advance Care Planning:   Living will: Yes    Durable POA for healthcare: Yes    Advanced directive: Yes    Advanced directive counseling given: No    Five wishes given: No    Patient declined ACP directive: No    End of Life Decisions reviewed with patient: Yes    Provider agrees with end of life decisions: Yes      PREVENTIVE SCREENINGS      Cardiovascular Screening:    General: Screening Current      Diabetes Screening:     General: Screening Current      Colorectal Cancer Screening:      "General: Screening Current      Breast Cancer Screening:     General: Screening Current      Cervical Cancer Screening:    General: Screening Not Indicated      Abdominal Aortic Aneurysm (AAA) Screening:        General: Screening Not Indicated      Lung Cancer Screening:     General: Screening Not Indicated      Hepatitis C Screening:    General: Screening Current    Screening, Brief Intervention, and Referral to Treatment (SBIRT)    Screening  Typical number of drinks in a day: 0  Typical number of drinks in a week: 0  Interpretation: Low risk drinking behavior.    AUDIT-C Screenin) How often did you have a drink containing alcohol in the past year? monthly or less  2) How many drinks did you have on a typical day when you were drinking in the past year? 1 to 2  3) How often did you have 6 or more drinks on one occasion in the past year? never    AUDIT-C Score: 1  Interpretation: Score 0-2 (female): Negative screen for alcohol misuse    Single Item Drug Screening:  How often have you used an illegal drug (including marijuana) or a prescription medication for non-medical reasons in the past year? never    Single Item Drug Screen Score: 0  Interpretation: Negative screen for possible drug use disorder    Vision Screening    Right eye Left eye Both eyes   Without correction      With correction 20/20 20/20 20/20        Physical Exam:     /84   Pulse 68   Resp 16   Ht 5' 6\" (1.676 m)   Wt 105 kg (232 lb)   SpO2 96%   BMI 37.45 kg/m²     Physical Exam  Vitals and nursing note reviewed.   Constitutional:       General: She is not in acute distress.     Appearance: Normal appearance. She is well-developed. She is obese. She is not diaphoretic.   HENT:      Head: Normocephalic and atraumatic.      Right Ear: External ear normal.      Left Ear: External ear normal.      Nose: Nose normal.   Eyes:      Extraocular Movements: Extraocular movements intact.      Conjunctiva/sclera: Conjunctivae normal.      " Pupils: Pupils are equal, round, and reactive to light.   Cardiovascular:      Rate and Rhythm: Normal rate and regular rhythm.      Heart sounds: Normal heart sounds. No murmur heard.     No friction rub. No gallop.   Pulmonary:      Effort: Pulmonary effort is normal. No respiratory distress.      Breath sounds: Normal breath sounds. No wheezing or rales.   Chest:      Chest wall: No tenderness.   Abdominal:      General: Bowel sounds are normal.      Palpations: Abdomen is soft.   Musculoskeletal:         General: No tenderness or deformity. Normal range of motion.      Cervical back: Normal range of motion and neck supple.   Skin:     General: Skin is warm and dry.      Capillary Refill: Capillary refill takes less than 2 seconds.      Findings: No rash.   Neurological:      General: No focal deficit present.      Mental Status: She is alert and oriented to person, place, and time.      Cranial Nerves: No cranial nerve deficit.      Sensory: No sensory deficit.      Motor: No abnormal muscle tone.      Coordination: Coordination normal.      Deep Tendon Reflexes: Reflexes normal.   Psychiatric:         Mood and Affect: Mood normal.         Behavior: Behavior normal.         Thought Content: Thought content normal.         Judgment: Judgment normal.          Delbert Meyers DO

## 2024-05-01 PROBLEM — Z00.00 WELCOME TO MEDICARE PREVENTIVE VISIT: Status: RESOLVED | Noted: 2024-03-18 | Resolved: 2024-05-01

## 2024-07-24 ENCOUNTER — HOSPITAL ENCOUNTER (OUTPATIENT)
Dept: MAMMOGRAPHY | Facility: HOSPITAL | Age: 69
Discharge: HOME | End: 2024-07-24
Payer: MEDICARE

## 2024-07-24 VITALS — WEIGHT: 231.48 LBS | HEIGHT: 66 IN | BODY MASS INDEX: 37.2 KG/M2

## 2024-07-24 DIAGNOSIS — Z12.31 ENCOUNTER FOR SCREENING MAMMOGRAM FOR MALIGNANT NEOPLASM OF BREAST: ICD-10-CM

## 2024-07-24 PROCEDURE — 77063 BREAST TOMOSYNTHESIS BI: CPT

## 2024-07-24 PROCEDURE — 77067 SCR MAMMO BI INCL CAD: CPT

## 2024-07-29 ENCOUNTER — TELEPHONE (OUTPATIENT)
Dept: FAMILY MEDICINE CLINIC | Facility: CLINIC | Age: 69
End: 2024-07-29

## 2024-07-29 NOTE — TELEPHONE ENCOUNTER
----- Message from Delbert Meyers DO sent at 7/26/2024  5:21 PM EDT -----  Normal screening mammogram.  Repeat study in 1 year

## 2024-08-06 ENCOUNTER — HOSPITAL ENCOUNTER (OUTPATIENT)
Facility: HOSPITAL | Age: 69
Discharge: HOME | End: 2024-08-06
Attending: OBSTETRICS & GYNECOLOGY
Payer: MEDICARE

## 2024-08-06 VITALS — HEIGHT: 65 IN | WEIGHT: 222 LBS | BODY MASS INDEX: 36.99 KG/M2

## 2024-08-06 DIAGNOSIS — Z78.0 ASYMPTOMATIC MENOPAUSAL STATE: ICD-10-CM

## 2024-08-06 PROCEDURE — 77080 DXA BONE DENSITY AXIAL: CPT

## 2024-08-28 LAB
25(OH)D3 SERPL-MCNC: 41 NG/ML (ref 30–100)
ALBUMIN SERPL-MCNC: 4.3 G/DL (ref 3.6–5.1)
ALBUMIN/GLOB SERPL: 1.9 (CALC) (ref 1–2.5)
ALP SERPL-CCNC: 71 U/L (ref 37–153)
ALT SERPL-CCNC: 20 U/L (ref 6–29)
AST SERPL-CCNC: 24 U/L (ref 10–35)
BASOPHILS # BLD AUTO: 61 CELLS/UL (ref 0–200)
BASOPHILS NFR BLD AUTO: 1.1 %
BILIRUB SERPL-MCNC: 1.1 MG/DL (ref 0.2–1.2)
BUN SERPL-MCNC: 24 MG/DL (ref 7–25)
BUN/CREAT SERPL: NORMAL (CALC) (ref 6–22)
CALCIUM SERPL-MCNC: 9.3 MG/DL (ref 8.6–10.4)
CHLORIDE SERPL-SCNC: 104 MMOL/L (ref 98–110)
CHOLEST SERPL-MCNC: 248 MG/DL
CHOLEST/HDLC SERPL: 2.4 (CALC)
CO2 SERPL-SCNC: 26 MMOL/L (ref 20–32)
CREAT SERPL-MCNC: 0.84 MG/DL (ref 0.5–1.05)
EOSINOPHIL # BLD AUTO: 143 CELLS/UL (ref 15–500)
EOSINOPHIL NFR BLD AUTO: 2.6 %
ERYTHROCYTE [DISTWIDTH] IN BLOOD BY AUTOMATED COUNT: 13.7 % (ref 11–15)
GFR/BSA.PRED SERPLBLD CYS-BASED-ARV: 76 ML/MIN/1.73M2
GLOBULIN SER CALC-MCNC: 2.3 G/DL (CALC) (ref 1.9–3.7)
GLUCOSE SERPL-MCNC: 93 MG/DL (ref 65–99)
HCT VFR BLD AUTO: 43.7 % (ref 35–45)
HDLC SERPL-MCNC: 105 MG/DL
HGB BLD-MCNC: 13.8 G/DL (ref 11.7–15.5)
LDLC SERPL CALC-MCNC: 125 MG/DL (CALC)
LYMPHOCYTES # BLD AUTO: 1958 CELLS/UL (ref 850–3900)
LYMPHOCYTES NFR BLD AUTO: 35.6 %
MCH RBC QN AUTO: 30.1 PG (ref 27–33)
MCHC RBC AUTO-ENTMCNC: 31.6 G/DL (ref 32–36)
MCV RBC AUTO: 95.2 FL (ref 80–100)
MONOCYTES # BLD AUTO: 605 CELLS/UL (ref 200–950)
MONOCYTES NFR BLD AUTO: 11 %
NEUTROPHILS # BLD AUTO: 2734 CELLS/UL (ref 1500–7800)
NEUTROPHILS NFR BLD AUTO: 49.7 %
NONHDLC SERPL-MCNC: 143 MG/DL (CALC)
PLATELET # BLD AUTO: 155 THOUSAND/UL (ref 140–400)
PMV BLD REES-ECKER: 11.9 FL (ref 7.5–12.5)
POTASSIUM SERPL-SCNC: 4 MMOL/L (ref 3.5–5.3)
PROT SERPL-MCNC: 6.6 G/DL (ref 6.1–8.1)
RBC # BLD AUTO: 4.59 MILLION/UL (ref 3.8–5.1)
SODIUM SERPL-SCNC: 138 MMOL/L (ref 135–146)
TRIGL SERPL-MCNC: 82 MG/DL
WBC # BLD AUTO: 5.5 THOUSAND/UL (ref 3.8–10.8)

## 2024-09-10 ENCOUNTER — OFFICE VISIT (OUTPATIENT)
Dept: FAMILY MEDICINE CLINIC | Facility: CLINIC | Age: 69
End: 2024-09-10
Payer: MEDICARE

## 2024-09-10 VITALS
BODY MASS INDEX: 37.49 KG/M2 | HEART RATE: 64 BPM | OXYGEN SATURATION: 97 % | DIASTOLIC BLOOD PRESSURE: 78 MMHG | SYSTOLIC BLOOD PRESSURE: 128 MMHG | HEIGHT: 65 IN | WEIGHT: 225 LBS | RESPIRATION RATE: 16 BRPM

## 2024-09-10 DIAGNOSIS — Z23 NEED FOR COVID-19 VACCINE: ICD-10-CM

## 2024-09-10 DIAGNOSIS — F51.01 PRIMARY INSOMNIA: ICD-10-CM

## 2024-09-10 DIAGNOSIS — E55.9 VITAMIN D DEFICIENCY: ICD-10-CM

## 2024-09-10 DIAGNOSIS — Z00.00 MEDICARE ANNUAL WELLNESS VISIT, SUBSEQUENT: ICD-10-CM

## 2024-09-10 DIAGNOSIS — D50.9 HYPOCHROMIC MICROCYTIC ANEMIA: Primary | ICD-10-CM

## 2024-09-10 DIAGNOSIS — Z23 FLU VACCINE NEED: ICD-10-CM

## 2024-09-10 PROBLEM — R04.0 RECURRENT EPISTAXIS: Status: RESOLVED | Noted: 2020-05-11 | Resolved: 2024-09-10

## 2024-09-10 PROCEDURE — 91320 SARSCV2 VAC 30MCG TRS-SUC IM: CPT | Performed by: FAMILY MEDICINE

## 2024-09-10 PROCEDURE — G0008 ADMIN INFLUENZA VIRUS VAC: HCPCS | Performed by: FAMILY MEDICINE

## 2024-09-10 PROCEDURE — 90662 IIV NO PRSV INCREASED AG IM: CPT | Performed by: FAMILY MEDICINE

## 2024-09-10 PROCEDURE — 90480 ADMN SARSCOV2 VAC 1/ONLY CMP: CPT | Performed by: FAMILY MEDICINE

## 2024-09-10 PROCEDURE — 99214 OFFICE O/P EST MOD 30 MIN: CPT | Performed by: FAMILY MEDICINE

## 2024-09-10 RX ORDER — ZOLPIDEM TARTRATE 10 MG/1
10 TABLET ORAL
Qty: 90 TABLET | Refills: 1 | Status: SHIPPED | OUTPATIENT
Start: 2024-09-10

## 2024-09-10 NOTE — PROGRESS NOTES
Subjective:      Patient ID: Dasia Beckford is a 69 y.o. female.    69-year-old female presents for follow-up of chronic conditions including insomnia, mixed hyperlipidemia.  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  No particular complaints.  Still has issues with insomnia.  As needed Ambien does help.  She is taking this on a nightly basis.  She does need refill.  Would like to receive flu vaccination as well as shingles vaccine.  She was seen by orthopedic surgeon for [facility] arthritis of the left hand        Past Medical History:   Diagnosis Date    Anemia     Cortical age-related cataract of both eyes 08/06/2020    Ovarian cyst     Right    Raynaud phenomenon        Family History   Problem Relation Age of Onset    Diabetes Mother     Osteoarthritis Mother     Diabetes Father     Aneurysm Father     Heart disease Brother     Hyperlipidemia Brother     Coronary artery disease Brother     Other Brother         Brain tumor     Other Brother     Multiple myeloma Brother     No Known Problems Paternal Aunt        Past Surgical History:   Procedure Laterality Date    CAUTERIZE INNER NOSE      REPLACEMENT TOTAL KNEE Bilateral     2012/2013        reports that she has never smoked. She has never used smokeless tobacco. She reports current alcohol use. She reports that she does not use drugs.      Current Outpatient Medications:     bimatoprost (Lumigan) 0.01 % ophthalmic drops, , Disp: , Rfl:     montelukast (SINGULAIR) 10 mg tablet, Take 1 tablet (10 mg total) by mouth daily at bedtime, Disp: 90 tablet, Rfl: 3    multivitamin (THERAGRAN) TABS, Take 1 tablet by mouth daily, Disp: , Rfl:     zolpidem (AMBIEN) 10 mg tablet, Take 1 tablet (10 mg total) by mouth daily at bedtime as needed for sleep, Disp: 90 tablet, Rfl: 1    The following portions of the patient's history were reviewed and updated as appropriate: allergies, current medications, past family  "history, past medical history, past social history, past surgical history and problem list.    Review of Systems   Constitutional: Negative.    HENT: Negative.     Eyes: Negative.    Respiratory: Negative.     Cardiovascular: Negative.    Gastrointestinal: Negative.    Endocrine: Negative.    Genitourinary: Negative.    Musculoskeletal:  Positive for arthralgias (Left hand base of thumb).   Skin: Negative.    Allergic/Immunologic: Negative.    Neurological: Negative.    Hematological: Negative.    Psychiatric/Behavioral:  Positive for sleep disturbance.            Objective:    /78   Pulse 64   Resp 16   Ht 5' 5\" (1.651 m)   Wt 102 kg (225 lb)   SpO2 97%   BMI 37.44 kg/m²      Physical Exam  Vitals and nursing note reviewed.   Constitutional:       General: She is not in acute distress.     Appearance: Normal appearance. She is well-developed. She is not diaphoretic.   HENT:      Head: Normocephalic and atraumatic.      Right Ear: Tympanic membrane, ear canal and external ear normal.      Left Ear: Tympanic membrane, ear canal and external ear normal.      Nose: Nose normal.   Eyes:      Extraocular Movements: Extraocular movements intact.      Conjunctiva/sclera: Conjunctivae normal.      Pupils: Pupils are equal, round, and reactive to light.   Cardiovascular:      Rate and Rhythm: Normal rate and regular rhythm.      Heart sounds: Normal heart sounds. No murmur heard.  Pulmonary:      Effort: Pulmonary effort is normal.      Breath sounds: Normal breath sounds.   Abdominal:      General: Bowel sounds are normal.      Palpations: Abdomen is soft.   Musculoskeletal:         General: Normal range of motion.      Cervical back: Normal range of motion and neck supple.   Skin:     General: Skin is warm and dry.      Findings: No rash.   Neurological:      General: No focal deficit present.      Mental Status: She is alert and oriented to person, place, and time. Mental status is at baseline.      Deep " Tendon Reflexes: Reflexes are normal and symmetric.   Psychiatric:         Mood and Affect: Mood normal.         Behavior: Behavior normal.         Thought Content: Thought content normal.         Judgment: Judgment normal.           Recent Results (from the past 1008 hour(s))   Lipid panel    Collection Time: 08/27/24  7:09 AM   Result Value Ref Range    Total Cholesterol 248 (H) <200 mg/dL     > OR = 50 mg/dL    Triglycerides 82 <150 mg/dL    LDL Calculated 125 (H) mg/dL (calc)    Chol HDLC Ratio 2.4 <5.0 (calc)    Non-HDL Cholesterol 143 (H) <130 mg/dL (calc)   Comprehensive metabolic panel    Collection Time: 08/27/24  7:09 AM   Result Value Ref Range    Glucose, Random 93 65 - 99 mg/dL    BUN 24 7 - 25 mg/dL    Creatinine 0.84 0.50 - 1.05 mg/dL    eGFR 76 > OR = 60 mL/min/1.73m2    SL AMB BUN/CREATININE RATIO SEE NOTE: 6 - 22 (calc)    Sodium 138 135 - 146 mmol/L    Potassium 4.0 3.5 - 5.3 mmol/L    Chloride 104 98 - 110 mmol/L    CO2 26 20 - 32 mmol/L    Calcium 9.3 8.6 - 10.4 mg/dL    Protein, Total 6.6 6.1 - 8.1 g/dL    Albumin 4.3 3.6 - 5.1 g/dL    Globulin 2.3 1.9 - 3.7 g/dL (calc)    Albumin/Globulin Ratio 1.9 1.0 - 2.5 (calc)    TOTAL BILIRUBIN 1.1 0.2 - 1.2 mg/dL    Alkaline Phosphatase 71 37 - 153 U/L    AST 24 10 - 35 U/L    ALT 20 6 - 29 U/L   CBC and differential    Collection Time: 08/27/24  7:09 AM   Result Value Ref Range    White Blood Cell Count 5.5 3.8 - 10.8 Thousand/uL    Red Blood Cell Count 4.59 3.80 - 5.10 Million/uL    Hemoglobin 13.8 11.7 - 15.5 g/dL    HCT 43.7 35.0 - 45.0 %    MCV 95.2 80.0 - 100.0 fL    MCH 30.1 27.0 - 33.0 pg    MCHC 31.6 (L) 32.0 - 36.0 g/dL    RDW 13.7 11.0 - 15.0 %    Platelet Count 155 140 - 400 Thousand/uL    SL AMB MPV 11.9 7.5 - 12.5 fL    Neutrophils (Absolute) 2,734 1,500 - 7,800 cells/uL    Lymphocytes (Absolute) 1,958 850 - 3,900 cells/uL    Monocytes (Absolute) 605 200 - 950 cells/uL    Eosinophils (Absolute) 143 15 - 500 cells/uL    Basophils ABS  61 0 - 200 cells/uL    Neutrophils 49.7 %    Lymphocytes 35.6 %    Monocytes 11.0 %    Eosinophils 2.6 %    Basophils PCT 1.1 %   Vitamin D 25 hydroxy    Collection Time: 08/27/24  7:09 AM   Result Value Ref Range    Vitamin D, 25-Hydroxy, Serum 41 30 - 100 ng/mL       Assessment/Plan:    Hypochromic microcytic anemia  Not anemic.  Last set of iron studies were normal    Primary insomnia  Patient continues doing well with as needed Ambien.  She has been on this for regular basis.  Refill provided.    Prior to prescribing the controlled substance, a patient search was performed on the [place] prescription drug monitoring program web site.  There was no evidence of diversion or misuse.  Prescription provided      Vitamin D deficiency  Therapeutic vitamin D level on over-the-counter supplementation.  Repeat vitamin D testing to be done in 6 months    Need for COVID-19 vaccine  COVID-19 vaccine provided in the office    Flu vaccine need  Flu vaccine provided in the office          Problem List Items Addressed This Visit          Blood    Hypochromic microcytic anemia - Primary     Not anemic.  Last set of iron studies were normal         Relevant Orders    CBC and differential       Neurology/Sleep    Primary insomnia     Patient continues doing well with as needed Ambien.  She has been on this for regular basis.  Refill provided.    Prior to prescribing the controlled substance, a patient search was performed on the [place] prescription drug monitoring program web site.  There was no evidence of diversion or misuse.  Prescription provided           Relevant Medications    zolpidem (AMBIEN) 10 mg tablet       Other    Flu vaccine need     Flu vaccine provided in the office         Relevant Orders    influenza vaccine, high-dose, PF 0.5 mL (Fluzone High Dose) (Completed)    Need for COVID-19 vaccine     COVID-19 vaccine provided in the office         Relevant Orders    COVID-19 Pfizer mRNA vaccine 12 yr and  older (Comirnaty pre-filled syringe) (Completed)    Vitamin D deficiency     Therapeutic vitamin D level on over-the-counter supplementation.  Repeat vitamin D testing to be done in 6 months         Relevant Orders    Vitamin D 25 hydroxy     Other Visit Diagnoses       Medicare annual wellness visit, subsequent        Relevant Orders    Comprehensive metabolic panel    Lipid panel

## 2024-09-10 NOTE — ASSESSMENT & PLAN NOTE
Therapeutic vitamin D level on over-the-counter supplementation.  Repeat vitamin D testing to be done in 6 months

## 2024-10-01 ENCOUNTER — TELEPHONE (OUTPATIENT)
Age: 69
End: 2024-10-01

## 2024-10-01 NOTE — TELEPHONE ENCOUNTER
The patient called she is having hand surgery on 10/23/24  she needs a pre op and an EKG   she would like to have it with Dr Meyers  if possible   please call to schedule thank you

## 2024-10-04 ENCOUNTER — CONSULT (OUTPATIENT)
Dept: FAMILY MEDICINE CLINIC | Facility: CLINIC | Age: 69
End: 2024-10-04
Payer: MEDICARE

## 2024-10-04 VITALS
WEIGHT: 225 LBS | HEIGHT: 65 IN | RESPIRATION RATE: 16 BRPM | DIASTOLIC BLOOD PRESSURE: 74 MMHG | OXYGEN SATURATION: 98 % | TEMPERATURE: 97.1 F | HEART RATE: 70 BPM | BODY MASS INDEX: 37.49 KG/M2 | SYSTOLIC BLOOD PRESSURE: 126 MMHG

## 2024-10-04 DIAGNOSIS — D50.9 HYPOCHROMIC MICROCYTIC ANEMIA: ICD-10-CM

## 2024-10-04 DIAGNOSIS — M18.12 ARTHRITIS OF CARPOMETACARPAL (CMC) JOINT OF LEFT THUMB: Primary | ICD-10-CM

## 2024-10-04 PROCEDURE — 99214 OFFICE O/P EST MOD 30 MIN: CPT | Performed by: FAMILY MEDICINE

## 2024-10-04 PROCEDURE — 93000 ELECTROCARDIOGRAM COMPLETE: CPT | Performed by: FAMILY MEDICINE

## 2024-10-04 NOTE — ASSESSMENT & PLAN NOTE
Patient is medically cleared for left thumb surgery under general anesthesia    -Clearance note faxed to orthopedic surgeon

## 2024-10-04 NOTE — PROGRESS NOTES
PRE-OPERATIVE EVALUATION  [facility]    NAME: Dasia Beckford  AGE: 69 y.o. SEX: female  : 1955     DATE: 10/4/2024     Pre-Operative Evaluation      Chief Complaint: Pre-operative Evaluation     Surgery: Left thumb CMC arthroplasty with tendon interposition  Anticipated Date of Surgery: 2024  Referring Provider: Ankush Hernandez M.D.     History of Present Illness:     Dasia Beckford is a 69 y.o. female who presents to the office today for a preoperative consultation at the request of surgeon, Dr. Hernandez, who plans on performing Left thumb CMC arthroplasty with tendon interposition on 9. Planned anesthesia is general. Patient has a bleeding risk of: no recent abnormal bleeding. Patient does not have objections to receiving blood products if needed. Current anti-platelet/anti-coagulation medications that the patient is prescribed includes:  None .      Assessment of Chronic Conditions:   - No significant chronic conditions     Assessment of Cardiac Risk:  Denies unstable or severe angina or MI in the last 6 weeks or history of stent placement in the last year   Denies decompensated heart failure (e.g. New onset heart failure, NYHA functional class IV heart failure, or worsening existing heart failure)  Denies significant arrhythmias such as high grade AV block, symptomatic ventricular arrhythmia, newly recognized ventricular tachycardia, supraventricular tachycardia with resting heart rate >100, or symptomatic bradycardia  Denies severe heart valve disease including aortic stenosis or symptomatic mitral stenosis     Exercise Capacity:  Able to walk 4 blocks without symptoms?: Yes  Able to walk 2 flights without symptoms?: Yes    Prior Anesthesia Reactions: No     Personal history of venous thromboembolic disease? No    History of steroid use for >2 weeks within last year? No    STOP-BANG Sleep Apnea Screening Questionnaire:         Review of Systems:     Review of Systems    Constitutional: Negative.    HENT: Negative.     Eyes: Negative.    Respiratory: Negative.     Cardiovascular: Negative.    Gastrointestinal: Negative.    Endocrine: Negative.    Genitourinary: Negative.    Musculoskeletal:  Positive for arthralgias (Left thumb).   Skin: Negative.    Allergic/Immunologic: Negative.    Neurological: Negative.    Hematological: Negative.    Psychiatric/Behavioral: Negative.         Current Problem List:     Patient Active Problem List   Diagnosis    Raynaud's disease without gangrene    Vitamin D deficiency    Family history of brain cancer    Family history of aneurysm    Flu vaccine need    Encounter for screening mammogram for malignant neoplasm of breast    Primary insomnia    Pelvic fullness in female    Seasonal allergies    Hypochromic microcytic anemia    Need for COVID-19 vaccine       Allergies:     No Known Allergies    Current Medications:       Current Outpatient Medications:     bimatoprost (Lumigan) 0.01 % ophthalmic drops, , Disp: , Rfl:     montelukast (SINGULAIR) 10 mg tablet, Take 1 tablet (10 mg total) by mouth daily at bedtime, Disp: 90 tablet, Rfl: 3    multivitamin (THERAGRAN) TABS, Take 1 tablet by mouth daily, Disp: , Rfl:     zolpidem (AMBIEN) 10 mg tablet, Take 1 tablet (10 mg total) by mouth daily at bedtime as needed for sleep, Disp: 90 tablet, Rfl: 1    Past Medical History:       Past Medical History:   Diagnosis Date    Anemia     Cortical age-related cataract of both eyes 08/06/2020    Ovarian cyst     Right    Raynaud phenomenon         Past Surgical History:   Procedure Laterality Date    CAUTERIZE INNER NOSE      REPLACEMENT TOTAL KNEE Bilateral     2012/2013        Family History   Problem Relation Age of Onset    Diabetes Mother     Osteoarthritis Mother     Diabetes Father     Aneurysm Father     Heart disease Brother     Hyperlipidemia Brother     Coronary artery disease Brother     Other Brother         Brain tumor     Other Brother      Multiple myeloma Brother     No Known Problems Paternal Aunt         Social History     Socioeconomic History    Marital status: /Civil Union     Spouse name: Not on file    Number of children: Not on file    Years of education: Not on file    Highest education level: Not on file   Occupational History    Not on file   Tobacco Use    Smoking status: Never    Smokeless tobacco: Never   Vaping Use    Vaping status: Never Used   Substance and Sexual Activity    Alcohol use: Yes     Comment: occasional    Drug use: No    Sexual activity: Not Currently     Partners: Male     Birth control/protection: None   Other Topics Concern    Not on file   Social History Narrative     , lives with      Social Determinants of Health     Financial Resource Strain: Not on file   Food Insecurity: No Food Insecurity (3/18/2024)    Hunger Vital Sign     Worried About Running Out of Food in the Last Year: Never true     Ran Out of Food in the Last Year: Never true   Transportation Needs: No Transportation Needs (3/18/2024)    PRAPARE - Transportation     Lack of Transportation (Medical): No     Lack of Transportation (Non-Medical): No   Physical Activity: Not on file   Stress: Not on file   Social Connections: Not on file   Intimate Partner Violence: Not on file   Housing Stability: Low Risk  (3/18/2024)    Housing Stability Vital Sign     Unable to Pay for Housing in the Last Year: No     Number of Times Moved in the Last Year: 1     Homeless in the Last Year: No        Physical Exam:     Physical Exam  Vitals and nursing note reviewed.   Constitutional:       General: She is not in acute distress.     Appearance: Normal appearance. She is obese. She is not ill-appearing.   HENT:      Mouth/Throat:      Mouth: Mucous membranes are moist.      Pharynx: Oropharynx is clear.      Comments: No loose teeth  Eyes:      Extraocular Movements: Extraocular movements intact.      Conjunctiva/sclera: Conjunctivae normal.       Pupils: Pupils are equal, round, and reactive to light.   Cardiovascular:      Rate and Rhythm: Normal rate and regular rhythm.      Pulses: Normal pulses.      Heart sounds: Normal heart sounds.   Pulmonary:      Effort: Pulmonary effort is normal.      Breath sounds: Normal breath sounds.   Musculoskeletal:      Right lower leg: No edema.      Left lower leg: No edema.   Neurological:      General: No focal deficit present.      Mental Status: She is alert and oriented to person, place, and time. Mental status is at baseline.   Psychiatric:         Mood and Affect: Mood normal.         Behavior: Behavior normal.         Thought Content: Thought content normal.         Judgment: Judgment normal.          Data:     Pre-operative work-up    Laboratory Results: I have personally reviewed the pertinent laboratory results/reports     EKG: EKG attached, normal sinus rhythm, normal EKG, no ST-T wave abnormalities          Assessment & Recommendations:     No diagnosis found.    Pre-Op Evaluation Assessment  69 y.o. female with planned surgery: Left thumb CMC arthroplasty with tendon interposition.    Known risk factors for perioperative complications: None.      Cardiac Risk Estimation: per the Revised Cardiac Risk Index (Circ. 100:1043, 1999), the patient's risk factors for cardiac complications include  none , putting her in: RCI RISK CLASS I (0 risk factors, risk of major cardiac compl. appr. 0.5%).    Current medications which may produce withdrawal symptoms if withheld perioperatively: None.    Pre-Op Evaluation Plan  1. Further preoperative workup as follows:   - None; no further preoperative work-up is required    2. Medication Management/Recommendations:   - None, continue medication regimen including morning of surgery, with sip of water    3. Prophylaxis for cardiac events with perioperative beta-blockers: not indicated.    4. Patient requires further consultation with: None    Clearance  Patient is CLEARED for  surgery without any additional cardiac testing.     Delbert Meyers DO  [facility]  [address] [address]  Phone#  [phone]  Fax#  [phone]

## 2024-12-01 ENCOUNTER — OFFICE VISIT (OUTPATIENT)
Dept: URGENT CARE | Facility: CLINIC | Age: 69
End: 2024-12-01
Payer: MEDICARE

## 2024-12-01 VITALS
HEART RATE: 76 BPM | TEMPERATURE: 98.1 F | OXYGEN SATURATION: 93 % | DIASTOLIC BLOOD PRESSURE: 81 MMHG | BODY MASS INDEX: 37.49 KG/M2 | SYSTOLIC BLOOD PRESSURE: 175 MMHG | HEIGHT: 65 IN | WEIGHT: 225 LBS

## 2024-12-01 DIAGNOSIS — J06.9 UPPER RESPIRATORY TRACT INFECTION, UNSPECIFIED TYPE: Primary | ICD-10-CM

## 2024-12-01 PROCEDURE — 99213 OFFICE O/P EST LOW 20 MIN: CPT | Performed by: PREVENTIVE MEDICINE

## 2024-12-01 PROCEDURE — G0463 HOSPITAL OUTPT CLINIC VISIT: HCPCS | Performed by: PREVENTIVE MEDICINE

## 2024-12-01 RX ORDER — METHYLPREDNISOLONE 4 MG/1
TABLET ORAL
Qty: 1 EACH | Refills: 0 | Status: SHIPPED | OUTPATIENT
Start: 2024-12-01

## 2024-12-01 RX ORDER — CODEINE PHOSPHATE AND GUAIFENESIN 10; 100 MG/5ML; MG/5ML
5 SOLUTION ORAL 3 TIMES DAILY PRN
Qty: 118 ML | Refills: 1 | Status: SHIPPED | OUTPATIENT
Start: 2024-12-01

## 2024-12-01 NOTE — PROGRESS NOTES
[facility]        NAME: Dasia Beckford is a 69 y.o. female  : 1955    MRN: [id number]  DATE: 2024  TIME: 3:12 PM    Assessment and Plan   Upper respiratory tract infection, unspecified type [J06.9]  1. Upper respiratory tract infection, unspecified type  methylPREDNISolone 4 MG tablet therapy pack    guaiFENesin-codeine (ROBITUSSIN AC) 100-10 mg/5 mL oral solution            Patient Instructions       Follow up with PCP in 3-5 days.  Proceed to  ER if symptoms worsen.    If tests have been performed at [facility], our office will contact you with results if changes need to be made to the care plan discussed with you at the visit.  You can review your full results on [facility].    Chief Complaint     Chief Complaint   Patient presents with    Cough     Pt reports that she has hd this ongoing cough and she has not slept in 2 days. She reports having post nasal drip and a runny nose.          History of Present Illness       Harsh unstoppable cough for 3 days.  No fever.  No shortness of breath.    Cough        Review of Systems   Review of Systems   Respiratory:  Positive for cough.          Current Medications       Current Outpatient Medications:     guaiFENesin-codeine (ROBITUSSIN AC) 100-10 mg/5 mL oral solution, Take 5 mL by mouth 3 (three) times a day as needed for cough, Disp: 118 mL, Rfl: 1    methylPREDNISolone 4 MG tablet therapy pack, Use as directed on package, Disp: 1 each, Rfl: 0    bimatoprost (Lumigan) 0.01 % ophthalmic drops, , Disp: , Rfl:     montelukast (SINGULAIR) 10 mg tablet, Take 1 tablet (10 mg total) by mouth daily at bedtime, Disp: 90 tablet, Rfl: 3    multivitamin (THERAGRAN) TABS, Take 1 tablet by mouth daily, Disp: , Rfl:     zolpidem (AMBIEN) 10 mg tablet, Take 1 tablet (10 mg total) by mouth daily at bedtime as needed for sleep, Disp: 90 tablet, Rfl: 1    Current Allergies     Allergies as of 2024    (No Known Allergies)            The  "following portions of the patient's history were reviewed and updated as appropriate: allergies, current medications, past family history, past medical history, past social history, past surgical history and problem list.     Past Medical History:   Diagnosis Date    Anemia     Cortical age-related cataract of both eyes 08/06/2020    Ovarian cyst     Right    Raynaud phenomenon        Past Surgical History:   Procedure Laterality Date    CAUTERIZE INNER NOSE      REPLACEMENT TOTAL KNEE Bilateral     2012/2013       Family History   Problem Relation Age of Onset    Diabetes Mother     Osteoarthritis Mother     Diabetes Father     Aneurysm Father     Heart disease Brother     Hyperlipidemia Brother     Coronary artery disease Brother     Other Brother         Brain tumor     Other Brother     Multiple myeloma Brother     No Known Problems Paternal Aunt          Medications have been verified.        Objective   BP (!) 175/81   Pulse 76   Temp 98.1 °F (36.7 °C)   Ht 5' 5\" (1.651 m)   Wt 102 kg (225 lb)   SpO2 93%   BMI 37.44 kg/m²   No LMP recorded. Patient is postmenopausal.       Physical Exam     Physical Exam  Pulmonary:      Breath sounds: Normal breath sounds. No wheezing, rhonchi or rales.                     "

## 2024-12-05 ENCOUNTER — EVALUATION (OUTPATIENT)
Dept: OCCUPATIONAL THERAPY | Facility: CLINIC | Age: 69
End: 2024-12-05
Payer: MEDICARE

## 2024-12-05 DIAGNOSIS — Z98.890 STATUS POST SURGERY: Primary | ICD-10-CM

## 2024-12-05 DIAGNOSIS — M19.049 CMC ARTHRITIS: ICD-10-CM

## 2024-12-05 PROCEDURE — 97165 OT EVAL LOW COMPLEX 30 MIN: CPT

## 2024-12-05 PROCEDURE — 97110 THERAPEUTIC EXERCISES: CPT

## 2024-12-05 NOTE — LETTER
2024    Ankush Hernandez MD  [address] [facility] [address]    Patient: Dasia Beckford   YOB: 1955   Date of Visit: 2024     Encounter Diagnosis     ICD-10-CM    1. Status post surgery  Z98.890       2. CMC arthritis  M19.049           Dear Dr. Hernandez:    Thank you for your recent referral of Dasia Beckford. Please review the attached evaluation summary from Dasia's recent visit.     Please verify that you agree with the plan of care by signing the attached order.     If you have any questions or concerns, please do not hesitate to call.     I sincerely appreciate the opportunity to share in the care of one of your patients and hope to have another opportunity to work with you in the near future.     Sincerely,    Jacob Leon, OT      Referring Provider:     I certify that I have read the below Plan of Care and certify the need for these services furnished under this plan of treatment while under my care.                    Ankush Hernandez MD  [address] [facility] [address]  Via Fax: [phone]        OT Evaluation     Today's date: 2024  Patient name: Dasia Beckford  : 1955  MRN: [id number]  Referring provider: Ankush Hernandez MD  Dx:   Encounter Diagnosis     ICD-10-CM    1. Status post surgery  Z98.890       2. CMC arthritis  M19.049                      Assessment  Impairments: abnormal or restricted ROM, abnormal movement, activity intolerance, impaired physical strength, lacks appropriate home exercise program, pain with function and weight-bearing intolerance  Symptom irritability: low    Assessment details: Patient presents to OP OT hand therapy services S/P left CMC arthroplasty w/ tendon interposition. Patient surgery took place on 10/23/24. Patient was casted for 4-5 weeks following the surgery. Patient was provided a thumb spica brace after the cast was discontinued which they didn't present wearing on this  date. Patient surgical incision appears to be healing very well with moderate scar tissue formation. There is mild edema in the thenar eminence and at the CMC joint. Patient reported numbness and tingling in the dorsal thumb that is resolving. Patient reports very mild pain at end range motion. Wrist and thumb ROM is WFL. Patient is able to oppose to the base of the 5th digit. /pinch strength was deferred secondary to surgical protocol. Will access as able. Patient was receptive to the education and was able to display understanding. Patient was provided a custom HEP and instructed on how to complete it. See below for a more detailed assessment.     Understanding of Dx/Px/POC: good     Prognosis: good    Goals   STG:    Patient will increase wrist ROM in all planes by an arch of motion of >20 degrees in 4 weeks    Patient will report decreased pain by 1-2 grades during functional movement in 4 weeks    Patient will begin to incorporate joint mechanics and activity modifications to ADLs in 4 weeks    Patient will begin gentle strengthening exercises on 12/09/24 as per protocol    LTG:    Patient will display ROM WFL in the wrist in 8 weeks or discharge    Patient will report resolution of pain  during all activities in 8 weeks or discharge    Patient will fully incorporate joint mechanics and activity modifications to ADLs in 8 weeks or discharge    Patient will increase FOTO score by >20 points in 8 weeks or discharge    Plan  Patient would benefit from: custom splinting and OT eval  Referral necessary: No  Planned modality interventions: ultrasound, thermotherapy: paraffin bath and thermotherapy: hydrocollator packs    Planned therapy interventions: balance/weight bearing training, coordination, home exercise program, functional ROM exercises, flexibility, IASTM, manual therapy, joint mobilization, orthotic fitting/training, strengthening, stretching, therapeutic activities and therapeutic  "exercise    Frequency: 2x week  Duration in weeks: 10  Plan of Care beginning date: 2024  Plan of Care expiration date: 2025  Treatment plan discussed with: patient  Plan details: Patient would benefit from skilled OP OT hand therapy services x per week for 8 weeks to increase ROM and return to full functional status.         Subjective Evaluation    History of Present Illness  Date of surgery: 10/23/2024  Mechanism of injury: surgery  Mechanism of injury: Surgery: CMC Arthroplasty w/ tendon interposition     Subjective:  \"I get pain at the base of the thumb (thenar eminence\". \"I get numbness on the back of the thumb that is getting better\". \"It moves on its own\". \"I was casted for 4-5 weeks\". \"They gave me a brace to wear after the cast, but it was rubbing so I didn't wear it\". \"It was more swollen before\". \"The incision is sensitive to the touch\". \"I had years of of and on pain in the thumb but it got to wear it was always on\". \"The movement is perfect\". \"I started doing exercises right away after the cast came off\". \"It doesn't hurt it just pulls\". \"The ache seems to be the weather, which is what I was blaming\". \"When I hold my phoenix it hurts but I'm holding my thumb and pinky very wide\".           Recurrent probem    Quality of life: good    Patient Goals  Patient goals for therapy: decreased edema, decreased pain, increased motion and increased strength    Pain  Current pain ratin  At best pain ratin  At worst pain ratin  Location: L Thumb CMC Joint and thenar emininace  Quality: dull ache  Relieving factors: relaxation and rest  Aggravating factors: lifting (General motion and ache)  Progression: improved    Social Support    Employment status: working ([employer] pre retirment)  Hand dominance: right    Treatments  Previous treatment: immobilization  Current treatment: occupational therapy        Objective     Observations     Left Wrist/Hand   Positive for incision. "     Additional Observation Details  L:  Well healed incision on radial wrist with moderate scar tissue formation    Tenderness     Left Wrist/Hand   No tenderness in the CMC joint and MCP joint.     Neurological Testing     Sensation     Wrist/Hand   Left   Intact: light touch  Paresthesia: light touch    Right   Intact: light touch    Comments   Left light touch: Dorsal thumb     Active Range of Motion     Left Wrist   Wrist flexion: 65 degrees   Wrist extension: 70 degrees   Radial deviation: 15 degrees   Ulnar deviation: 15 degrees with pain      Right Wrist   Wrist flexion: 68 degrees   Wrist extension: 80 degrees   Radial deviation: 15 degrees   Ulnar deviation: 25 degrees     Left Thumb   Flexion     MP: 42 degrees    DIP: 68 degrees  Palmar Abduction     CMC: 40 degrees  Radial abduction    CMC: 40 degrees  Kapandji score: 10 degrees      Right Thumb   Flexion     MP: 48    DIP: 80  Radial Abduction    CMC: 40  Adduction    CMC: 40  Kapandji score: 10 degrees    Additional Active Range of Motion Details  B/L Full composite fists    Strength/Myotome Testing     Additional Strength Details  Deferred secondary to healing timeline and surgical protocol. Will access as able    Swelling     Left Wrist/Hand   Thumb     Proximal: 6.3 cm  Circumference wrist: 15.8 cm    Right Wrist/Hand   Thumb     Proximal: 6.5 cm  Circumference wrist: 15.5 cm             Precautions: CMC Arthroplasty w/ tendon interposition   - See attached protocol  DOS: 10/23/24        Manuals 12/5            IASTM             MEM             Tubigrip Size C                         Neuro Re-Ed                                                                                                        Ther Ex             HEP Thumb AROM    MP/IP blocking            Blocking IP/MP             Thumb AROM>AAROM             Dex balls             Wrist Maze             Wrist F/E on wedge             Opp penelope                                                                               Ther Activity             Keypeacacia             Colored pegs                                                    Modalities             P

## 2024-12-05 NOTE — PROGRESS NOTES
OT Evaluation     Today's date: 2024  Patient name: Dasia Beckford  : 1955  MRN: [id number]  Referring provider: Ankush Hernandez MD  Dx:   Encounter Diagnosis     ICD-10-CM    1. Status post surgery  Z98.890       2. CMC arthritis  M19.049                      Assessment  Impairments: abnormal or restricted ROM, abnormal movement, activity intolerance, impaired physical strength, lacks appropriate home exercise program, pain with function and weight-bearing intolerance  Symptom irritability: low    Assessment details: Patient presents to OP OT hand therapy services S/P left CMC arthroplasty w/ tendon interposition. Patient surgery took place on 10/23/24. Patient was casted for 4-5 weeks following the surgery. Patient was provided a thumb spica brace after the cast was discontinued which they didn't present wearing on this date. Patient surgical incision appears to be healing very well with moderate scar tissue formation. There is mild edema in the thenar eminence and at the CMC joint. Patient reported numbness and tingling in the dorsal thumb that is resolving. Patient reports very mild pain at end range motion. Wrist and thumb ROM is WFL. Patient is able to oppose to the base of the 5th digit. /pinch strength was deferred secondary to surgical protocol. Will access as able. Patient was receptive to the education and was able to display understanding. Patient was provided a custom HEP and instructed on how to complete it. See below for a more detailed assessment.     Understanding of Dx/Px/POC: good     Prognosis: good    Goals   STG:    Patient will increase wrist ROM in all planes by an arch of motion of >20 degrees in 4 weeks    Patient will report decreased pain by 1-2 grades during functional movement in 4 weeks    Patient will begin to incorporate joint mechanics and activity modifications to ADLs in 4 weeks    Patient will begin gentle strengthening exercises on 24 as per  "protocol    LTG:    Patient will display ROM WFL in the wrist in 8 weeks or discharge    Patient will report resolution of pain  during all activities in 8 weeks or discharge    Patient will fully incorporate joint mechanics and activity modifications to ADLs in 8 weeks or discharge    Patient will increase FOTO score by >20 points in 8 weeks or discharge    Plan  Patient would benefit from: custom splinting and OT eval  Referral necessary: No  Planned modality interventions: ultrasound, thermotherapy: paraffin bath and thermotherapy: hydrocollator packs    Planned therapy interventions: balance/weight bearing training, coordination, home exercise program, functional ROM exercises, flexibility, IASTM, manual therapy, joint mobilization, orthotic fitting/training, strengthening, stretching, therapeutic activities and therapeutic exercise    Frequency: 2x week  Duration in weeks: 10  Plan of Care beginning date: 12/5/2024  Plan of Care expiration date: 2/5/2025  Treatment plan discussed with: patient  Plan details: Patient would benefit from skilled OP OT hand therapy services x per week for 8 weeks to increase ROM and return to full functional status.         Subjective Evaluation    History of Present Illness  Date of surgery: 10/23/2024  Mechanism of injury: surgery  Mechanism of injury: Surgery: CMC Arthroplasty w/ tendon interposition     Subjective:  \"I get pain at the base of the thumb (thenar eminence\". \"I get numbness on the back of the thumb that is getting better\". \"It moves on its own\". \"I was casted for 4-5 weeks\". \"They gave me a brace to wear after the cast, but it was rubbing so I didn't wear it\". \"It was more swollen before\". \"The incision is sensitive to the touch\". \"I had years of of and on pain in the thumb but it got to wear it was always on\". \"The movement is perfect\". \"I started doing exercises right away after the cast came off\". \"It doesn't hurt it just pulls\". \"The ache seems to be the " "weather, which is what I was blaming\". \"When I hold my phoenix it hurts but I'm holding my thumb and pinky very wide\".           Recurrent probem    Quality of life: good    Patient Goals  Patient goals for therapy: decreased edema, decreased pain, increased motion and increased strength    Pain  Current pain ratin  At best pain ratin  At worst pain ratin  Location: L Thumb CMC Joint and thenar emininace  Quality: dull ache  Relieving factors: relaxation and rest  Aggravating factors: lifting (General motion and ache)  Progression: improved    Social Support    Employment status: working ([employer] pre retirment)  Hand dominance: right    Treatments  Previous treatment: immobilization  Current treatment: occupational therapy        Objective     Observations     Left Wrist/Hand   Positive for incision.     Additional Observation Details  L:  Well healed incision on radial wrist with moderate scar tissue formation    Tenderness     Left Wrist/Hand   No tenderness in the CMC joint and MCP joint.     Neurological Testing     Sensation     Wrist/Hand   Left   Intact: light touch  Paresthesia: light touch    Right   Intact: light touch    Comments   Left light touch: Dorsal thumb     Active Range of Motion     Left Wrist   Wrist flexion: 65 degrees   Wrist extension: 70 degrees   Radial deviation: 15 degrees   Ulnar deviation: 15 degrees with pain      Right Wrist   Wrist flexion: 68 degrees   Wrist extension: 80 degrees   Radial deviation: 15 degrees   Ulnar deviation: 25 degrees     Left Thumb   Flexion     MP: 42 degrees    DIP: 68 degrees  Palmar Abduction     CMC: 40 degrees  Radial abduction    CMC: 40 degrees  Kapandji score: 10 degrees      Right Thumb   Flexion     MP: 48    DIP: 80  Radial Abduction    CMC: 40  Adduction    CMC: 40  Kapandji score: 10 degrees    Additional Active Range of Motion Details  B/L Full composite fists    Strength/Myotome Testing     Additional Strength " Details  Deferred secondary to healing timeline and surgical protocol. Will access as able    Swelling     Left Wrist/Hand   Thumb     Proximal: 6.3 cm  Circumference wrist: 15.8 cm    Right Wrist/Hand   Thumb     Proximal: 6.5 cm  Circumference wrist: 15.5 cm             Precautions: CMC Arthroplasty w/ tendon interposition   - See attached protocol  DOS: 10/23/24        Manuals 12/5            IASTM             MEM             Tubigrip Size C                         Neuro Re-Ed                                                                                                        Ther Ex             HEP Thumb AROM    MP/IP blocking            Blocking IP/MP             Thumb AROM>AAROM             Dex balls             Wrist Maze             Wrist F/E on wedge             Opp marbles                                                                              Ther Activity             Keypegs             Colored pegs                                                    Modalities             MHP

## 2024-12-09 ENCOUNTER — OFFICE VISIT (OUTPATIENT)
Dept: OCCUPATIONAL THERAPY | Facility: CLINIC | Age: 69
End: 2024-12-09
Payer: MEDICARE

## 2024-12-09 DIAGNOSIS — Z98.890 STATUS POST SURGERY: Primary | ICD-10-CM

## 2024-12-09 DIAGNOSIS — M19.049 CMC ARTHRITIS: ICD-10-CM

## 2024-12-09 PROCEDURE — 97110 THERAPEUTIC EXERCISES: CPT

## 2024-12-09 PROCEDURE — 97140 MANUAL THERAPY 1/> REGIONS: CPT

## 2024-12-09 NOTE — PROGRESS NOTES
"Daily Note     Today's date: 2024  Patient name: Dasia Beckford  : 1955  MRN: [id number]  Referring provider: Ankush Hernandez MD  Dx:   Encounter Diagnosis     ICD-10-CM    1. Status post surgery  Z98.890       2. CMC arthritis  M19.049                      Subjective: \"The brace makes it feel worse\".       Objective: See treatment diary below      Assessment: Tolerated treatment well. Patient reported no longer wearing their splint. Therapist encouraged patient to continue wearing their brace to prevent re-injury. May fabricate a thumb spica brace to better support the thumb if they continue to report not wearing the other brace.       Plan: Continue per plan of care.  Progress treatment as tolerated.       Precautions: CMC Arthroplasty w/ tendon interposition   - See attached protocol  DOS: 10/23/24        Manuals            IASTM  8'           MEM             Tubigrip Size C                         Neuro Re-Ed                                                                                                        Ther Ex             HEP Thumb AROM    MP/IP blocking            Blocking IP/MP  X20           Thumb AROM>AAROM             Dex balls  2'           Wrist Maze  x5           Wrist F/E on wedge  x30           Opp marbles  x20                                                                            Ther Activity             Keypegs  x1           Colored pegs  X3 rows in hand manip                                                  Modalities             MHP  5'                             "

## 2024-12-11 ENCOUNTER — APPOINTMENT (OUTPATIENT)
Dept: OCCUPATIONAL THERAPY | Facility: CLINIC | Age: 69
End: 2024-12-11
Payer: MEDICARE

## 2024-12-13 ENCOUNTER — OFFICE VISIT (OUTPATIENT)
Dept: OCCUPATIONAL THERAPY | Facility: CLINIC | Age: 69
End: 2024-12-13
Payer: MEDICARE

## 2024-12-13 DIAGNOSIS — M19.049 CMC ARTHRITIS: ICD-10-CM

## 2024-12-13 DIAGNOSIS — Z98.890 STATUS POST SURGERY: Primary | ICD-10-CM

## 2024-12-13 PROCEDURE — 97110 THERAPEUTIC EXERCISES: CPT

## 2024-12-13 PROCEDURE — 97140 MANUAL THERAPY 1/> REGIONS: CPT

## 2024-12-13 NOTE — PROGRESS NOTES
"Daily Note     Today's date: 2024  Patient name: Dasia Beckford  : 1955  MRN: [id number]  Referring provider: Ankush Hernandez MD  Dx:   Encounter Diagnosis     ICD-10-CM    1. Status post surgery  Z98.890       2. CMC arthritis  M19.049                      Subjective: \"The brace makes it feel worse\".       Objective: See treatment diary below      Assessment: Tolerated treatment well. Elastomere significantly improved scar tissue. But, it continues to be hypersensitive. Patient continues to report being unable to tolerate the brace, which therapy encouraged them to wear it until they are cleared by orthopedics to d/c.       Plan: Continue per plan of care.  Progress treatment as tolerated.       Precautions: CMC Arthroplasty w/ tendon interposition   - See attached protocol  DOS: 10/23/24        Manuals           IASTM  8' 8'          MEM             Tubigrip Size C                         Neuro Re-Ed                                                                                                        Ther Ex             HEP Thumb AROM    MP/IP blocking            Blocking IP/MP  X20 x20          Thumb AROM>AAROM             Dex balls  2' 2'          Wrist Maze  x5 x5          Wrist F/E on wedge  x30 x30          Opp marbles  x20 All marbles w/ \"C\" pinch                                                                           Ther Activity             Keypegs  x1 x1          Colored pegs  X3 rows in hand manip X3 rows in hand manip                                                 Modalities             MHP  5' 5'                            "

## 2024-12-16 ENCOUNTER — OFFICE VISIT (OUTPATIENT)
Dept: OCCUPATIONAL THERAPY | Facility: CLINIC | Age: 69
End: 2024-12-16
Payer: MEDICARE

## 2024-12-16 DIAGNOSIS — M19.049 CMC ARTHRITIS: ICD-10-CM

## 2024-12-16 DIAGNOSIS — Z98.890 STATUS POST SURGERY: Primary | ICD-10-CM

## 2024-12-16 PROCEDURE — 97140 MANUAL THERAPY 1/> REGIONS: CPT

## 2024-12-16 PROCEDURE — 97110 THERAPEUTIC EXERCISES: CPT

## 2024-12-16 NOTE — PROGRESS NOTES
"Daily Note     Today's date: 2024  Patient name: Dasia Beckford  : 1955  MRN: [id number]  Referring provider: Ankush Hernandez MD  Dx:   Encounter Diagnosis     ICD-10-CM    1. Status post surgery  Z98.890       2. CMC arthritis  M19.049                      Subjective: \"The weather makes it sore\".       Objective: See treatment diary below      Assessment: Tolerated treatment well. Patient continues to display full opposition. Will incorporate gentle strengthening as per protocol next week at the 8 week francheska.       Plan: Continue per plan of care.  Progress treatment as tolerated.       Precautions: CMC Arthroplasty w/ tendon interposition   - See attached protocol  DOS: 10/23/24        Manuals          IASTM  8' 8' 8'         MEM             Tubigrip Size C                         Neuro Re-Ed                                                                                                        Ther Ex             HEP Thumb AROM    MP/IP blocking            Blocking IP/MP  X20 x20 x20         Thumb AROM>AAROM             Dex balls  2' 2' 2'         Wrist Maze  x5 x5 x5         Wrist F/E on wedge  x30 x30 x30         Opp marbles  x20 All marbles w/ \"C\" pinch All marbles w/ \"C\" pinch                                                                          Ther Activity             Keypegs  x1 x1 x1         Colored pegs  X3 rows in hand manip X3 rows in hand manip X3 rows in hand manip                                                Modalities             MHP  5' 5' 5'                           "

## 2024-12-18 ENCOUNTER — OFFICE VISIT (OUTPATIENT)
Dept: OCCUPATIONAL THERAPY | Facility: CLINIC | Age: 69
End: 2024-12-18
Payer: MEDICARE

## 2024-12-18 DIAGNOSIS — Z98.890 STATUS POST SURGERY: Primary | ICD-10-CM

## 2024-12-18 DIAGNOSIS — M19.049 CMC ARTHRITIS: ICD-10-CM

## 2024-12-18 PROCEDURE — 97140 MANUAL THERAPY 1/> REGIONS: CPT

## 2024-12-18 PROCEDURE — 97110 THERAPEUTIC EXERCISES: CPT

## 2024-12-18 NOTE — PROGRESS NOTES
"Daily Note     Today's date: 2024  Patient name: Dasia Beckford  : 1955  MRN: [id number]  Referring provider: Ankush Hernandez MD  Dx:   Encounter Diagnosis     ICD-10-CM    1. Status post surgery  Z98.890       2. CMC arthritis  M19.049                      Subjective: \"I still get cramping in the palm\".      Objective: See treatment diary below      Assessment: Tolerated treatment well. Incorporated gentle resistive exercises as patient is 8 weeks s/p surgery. Patient tolerated exercises very well.     Plan: Continue per plan of care.  Progress treatment as tolerated.       Precautions: CMC Arthroplasty w/ tendon interposition   - See attached protocol  DOS: 10/23/24        Manuals         IASTM  8' 8' 8' 8'        MEM             Tubigrip Size C                         Neuro Re-Ed                                                                                                        Ther Ex             HEP Thumb AROM    MP/IP blocking            Blocking IP/MP  X20 x20 x20 x20        Thumb AROM>AAROM             Dex balls  2' 2' 2' 2'        Wrist Maze  x5 x5 x5 x5        Wrist F/E on wedge  x30 x30 x30 x30        Opp marbles  x20 All marbles w/ \"C\" pinch All marbles w/ \"C\" pinch All marbles w/ \"C\" pinch        Power web     Y center and rim x20        Pinch ring     Y/Y x1        Velcro pulls     All D/C                                 Ther Activity             Keypegs  x1 x1 x1 x1        Colored pegs  X3 rows in hand manip X3 rows in hand manip X3 rows in hand manip X3 rows in hand manip                                               Modalities             MHP  5' 5' 5' 5'                          "

## 2024-12-24 ENCOUNTER — APPOINTMENT (OUTPATIENT)
Dept: OCCUPATIONAL THERAPY | Facility: CLINIC | Age: 69
End: 2024-12-24
Payer: MEDICARE

## 2024-12-27 ENCOUNTER — APPOINTMENT (OUTPATIENT)
Dept: OCCUPATIONAL THERAPY | Facility: CLINIC | Age: 69
End: 2024-12-27
Payer: MEDICARE

## 2024-12-31 ENCOUNTER — OFFICE VISIT (OUTPATIENT)
Dept: OCCUPATIONAL THERAPY | Facility: CLINIC | Age: 69
End: 2024-12-31
Payer: MEDICARE

## 2024-12-31 DIAGNOSIS — M19.049 CMC ARTHRITIS: ICD-10-CM

## 2024-12-31 DIAGNOSIS — Z98.890 STATUS POST SURGERY: Primary | ICD-10-CM

## 2024-12-31 PROCEDURE — 97110 THERAPEUTIC EXERCISES: CPT

## 2024-12-31 PROCEDURE — 97140 MANUAL THERAPY 1/> REGIONS: CPT

## 2024-12-31 NOTE — PROGRESS NOTES
"Daily Note     Today's date: 2024  Patient name: Dasia Beckford  : 1955  MRN: [id number]  Referring provider: Ankush Hernandez MD  Dx:   Encounter Diagnosis     ICD-10-CM    1. Status post surgery  Z98.890       2. CMC arthritis  M19.049                      Subjective: \"The doctor was happy with the outcome so far\".       Objective: See treatment diary below      Assessment: Tolerated treatment well. Continued to progress resistive exercises as above which patient tolerated well.   Plan: Continue per plan of care.  Progress treatment as tolerated.       Precautions: CMC Arthroplasty w/ tendon interposition   - See attached protocol  DOS: 10/23/24        Manuals        IASTM  8' 8' 8' 8' 8'       MEM             Tubigrip Size C                         Neuro Re-Ed                                                                                                        Ther Ex             HEP Thumb AROM    MP/IP blocking            Blocking IP/MP  X20 x20 x20 x20 x20       Thumb AROM>AAROM      4' PROM IP and MP       Dex balls  2' 2' 2' 2' 2'       Wrist Maze  x5 x5 x5 x5 x5       Wrist F/E on wedge  x30 x30 x30 x30 x30       Opp marbles  x20 All marbles w/ \"C\" pinch All marbles w/ \"C\" pinch All marbles w/ \"C\" pinch All marbles w/ \"C\" pinch       Power web     Y center and rim x20 Y center and rim x20       Pinch ring     Y/Y x1 Y/R x1       Velcro pulls     All D/C                                 Ther Activity             Keypegs  x1 x1 x1 x1 x1       Colored pegs  X3 rows in hand manip X3 rows in hand manip X3 rows in hand manip X3 rows in hand manip X3 rows in hand manip                                              Modalities             MHP  5' 5' 5' 5' 5'                         "

## 2025-01-03 ENCOUNTER — OFFICE VISIT (OUTPATIENT)
Dept: OCCUPATIONAL THERAPY | Facility: CLINIC | Age: 70
End: 2025-01-03
Payer: MEDICARE

## 2025-01-03 DIAGNOSIS — M19.049 CMC ARTHRITIS: ICD-10-CM

## 2025-01-03 DIAGNOSIS — Z98.890 STATUS POST SURGERY: Primary | ICD-10-CM

## 2025-01-03 PROCEDURE — 97110 THERAPEUTIC EXERCISES: CPT

## 2025-01-03 PROCEDURE — 97140 MANUAL THERAPY 1/> REGIONS: CPT

## 2025-01-03 NOTE — PROGRESS NOTES
"Daily Note     Today's date: 1/3/2025  Patient name: Dasia Beckford  : 1955  MRN: [id number]  Referring provider: Ankush Hernandez MD  Dx:   Encounter Diagnosis     ICD-10-CM    1. Status post surgery  Z98.890       2. CMC arthritis  M19.049                      Subjective: \" It is feeling great, a few more visits and I think I'll be ready to be done\".       Objective: See treatment diary below  [id number] MHP  [id number] Manual  [phone] unsup  [phone] TE  [phone] unsup  [phone] TE    Assessment: Tolerated treatment well. Continued to incorporate additional resistance exercises as patient is tolerating them well.     Precautions: CMC Arthroplasty w/ tendon interposition   - See attached protocol  DOS: 10/23/24        Manuals       IASTM  8' 8' 8' 8' 8' 8'      MEM             Tubigrip Size C                         Neuro Re-Ed                                                                                                        Ther Ex             HEP Thumb AROM    MP/IP blocking            Blocking IP/MP  X20 x20 x20 x20 x20 x20      Thumb AROM>AAROM      4' PROM IP and MP 4' PROM IP and MP      Dex balls  2' 2' 2' 2' 2' 2      Wrist Maze  x5 x5 x5 x5 x5 x5      Wrist F/E on wedge  x30 x30 x30 x30 x30 x30      Opp marbles  x20 All marbles w/ \"C\" pinch All marbles w/ \"C\" pinch All marbles w/ \"C\" pinch All marbles w/ \"C\" pinch All marbles w/ \"C\" pinch      Power web     Y center and rim x20 Y center and rim x20 Y center and rim x20      Pinch ring     Y/Y x1 Y/R x1 Y/R x1      Velcro pulls     All D/C                                 Ther Activity             Keypegs  x1 x1 x1 x1 x1 x1      Colored pegs  X3 rows in hand manip X3 rows in hand manip X3 rows in hand manip X3 rows in hand manip X3 rows in hand manip X3 rows      Putty Item Find                                       Modalities             MHP  5' 5' 5' 5' 5' 5'                        "

## 2025-01-07 ENCOUNTER — OFFICE VISIT (OUTPATIENT)
Dept: OCCUPATIONAL THERAPY | Facility: CLINIC | Age: 70
End: 2025-01-07
Payer: MEDICARE

## 2025-01-07 DIAGNOSIS — M19.049 CMC ARTHRITIS: ICD-10-CM

## 2025-01-07 DIAGNOSIS — Z98.890 STATUS POST SURGERY: Primary | ICD-10-CM

## 2025-01-07 PROCEDURE — 97110 THERAPEUTIC EXERCISES: CPT

## 2025-01-07 PROCEDURE — 97140 MANUAL THERAPY 1/> REGIONS: CPT

## 2025-01-07 NOTE — PROGRESS NOTES
"Daily Note     Today's date: 2025  Patient name: Dasia Beckford  : 1955  MRN: [id number]  Referring provider: Ankush Hernandez MD  Dx:   Encounter Diagnosis     ICD-10-CM    1. Status post surgery  Z98.890       2. CMC arthritis  M19.049                      Subjective: \" I'm very happy with how it feels\".       Objective: See treatment diary below      Assessment: Tolerated treatment well. Continued to progress resistance exercises. Patient ROM continues to be WNL.   Precautions: CMC Arthroplasty w/ tendon interposition   - See attached protocol  DOS: 10/23/24        Manuals      IASTM  8' 8' 8' 8' 8' 8' 8'     MEM             Tubigrip Size C                         Neuro Re-Ed                                                                                                        Ther Ex             HEP Thumb AROM    MP/IP blocking            Blocking IP/MP  X20 x20 x20 x20 x20 x20 x20     Thumb AROM>AAROM      4' PROM IP and MP 4' PROM IP and MP 4' PROM IP and MP     Dex balls  2' 2' 2' 2' 2' 2 2'     Wrist Maze  x5 x5 x5 x5 x5 x5 x5     Wrist F/E on wedge  x30 x30 x30 x30 x30 x30 x30     Opp marbles  x20 All marbles w/ \"C\" pinch All marbles w/ \"C\" pinch All marbles w/ \"C\" pinch All marbles w/ \"C\" pinch All marbles w/ \"C\" pinch All marbles w/ \"C\" pinch     Power web     Y center and rim x20 Y center and rim x20 Y center and rim x20 Y center and rim x20     Pinch ring     Y/Y x1 Y/R x1 Y/R x1 R/R x1     Velcro pulls     All D/C                                 Ther Activity             Keypegs  x1 x1 x1 x1 x1 x1 x1     Colored pegs  X3 rows in hand manip X3 rows in hand manip X3 rows in hand manip X3 rows in hand manip X3 rows in hand manip X3 rows X3 rows     Putty Item Find                                       Modalities             MHP  5' 5' 5' 5' 5' 5' 5'                       "

## 2025-01-10 ENCOUNTER — APPOINTMENT (OUTPATIENT)
Dept: OCCUPATIONAL THERAPY | Facility: CLINIC | Age: 70
End: 2025-01-10
Payer: MEDICARE

## 2025-01-13 ENCOUNTER — OFFICE VISIT (OUTPATIENT)
Dept: OCCUPATIONAL THERAPY | Facility: CLINIC | Age: 70
End: 2025-01-13
Payer: MEDICARE

## 2025-01-13 DIAGNOSIS — M19.049 CMC ARTHRITIS: ICD-10-CM

## 2025-01-13 DIAGNOSIS — Z98.890 STATUS POST SURGERY: Primary | ICD-10-CM

## 2025-01-13 PROCEDURE — 97110 THERAPEUTIC EXERCISES: CPT

## 2025-01-13 PROCEDURE — 97140 MANUAL THERAPY 1/> REGIONS: CPT

## 2025-01-13 NOTE — PROGRESS NOTES
"Daily Note     Today's date: 2025  Patient name: Dasia Beckford  : 1955  MRN: [id number]  Referring provider: Ankush Hernandez MD  Dx:   Encounter Diagnosis     ICD-10-CM    1. Status post surgery  Z98.890       2. CMC arthritis  M19.049                      Subjective: \" I think I'll be ready to be done after the next visit\".       Objective: See treatment diary below      Assessment: Tolerated treatment well. Patient and therapist discussed discharge following their next visit as they have progressed very well and are confident to transition to a final HEP.   DOS: 10/23/24        Manuals     IASTM  8' 8' 8' 8' 8' 8' 8' 8'    MEM             Tubigrip Size C                         Neuro Re-Ed                                                                                                        Ther Ex             HEP Thumb AROM    MP/IP blocking            Blocking IP/MP  X20 x20 x20 x20 x20 x20 x20 x20    Thumb AROM>AAROM      4' PROM IP and MP 4' PROM IP and MP 4' PROM IP and MP 4' PROM IP and MP    Dex balls  2' 2' 2' 2' 2' 2 2' 2'    Wrist Maze  x5 x5 x5 x5 x5 x5 x5 x5    Wrist F/E on wedge  x30 x30 x30 x30 x30 x30 x30 #2 x 20 F/E    Opp marbles  x20 All marbles w/ \"C\" pinch All marbles w/ \"C\" pinch All marbles w/ \"C\" pinch All marbles w/ \"C\" pinch All marbles w/ \"C\" pinch All marbles w/ \"C\" pinch D/C    Power web     Y center and rim x20 Y center and rim x20 Y center and rim x20 Y center and rim x20 R center and rim x20    Pinch ring     Y/Y x1 Y/R x1 Y/R x1 R/R x1 R/R x1    Velcro pulls     All D/C                                 Ther Activity             Keypegs  x1 x1 x1 x1 x1 x1 x1 x1    Colored pegs  X3 rows in hand manip X3 rows in hand manip X3 rows in hand manip X3 rows in hand manip X3 rows in hand manip X3 rows X3 rows     Putty Item Find         X 10 items                              Modalities             MHP  5' 5' 5' 5' 5' 5' 5' 5'  "

## 2025-01-15 ENCOUNTER — APPOINTMENT (OUTPATIENT)
Dept: OCCUPATIONAL THERAPY | Facility: CLINIC | Age: 70
End: 2025-01-15
Payer: MEDICARE

## 2025-02-25 DIAGNOSIS — F51.01 PRIMARY INSOMNIA: ICD-10-CM

## 2025-02-26 DIAGNOSIS — F51.01 PRIMARY INSOMNIA: ICD-10-CM

## 2025-02-26 RX ORDER — ZOLPIDEM TARTRATE 10 MG/1
TABLET ORAL
Qty: 90 TABLET | Refills: 0 | OUTPATIENT
Start: 2025-02-26

## 2025-02-26 RX ORDER — ZOLPIDEM TARTRATE 10 MG/1
10 TABLET ORAL
Qty: 90 TABLET | Refills: 0 | Status: SHIPPED | OUTPATIENT
Start: 2025-02-26

## 2025-05-23 DIAGNOSIS — F51.01 PRIMARY INSOMNIA: ICD-10-CM

## 2025-05-23 DIAGNOSIS — J30.2 SEASONAL ALLERGIES: ICD-10-CM

## 2025-05-24 RX ORDER — MONTELUKAST SODIUM 10 MG/1
10 TABLET ORAL
Qty: 90 TABLET | Refills: 0 | Status: SHIPPED | OUTPATIENT
Start: 2025-05-24

## 2025-05-27 RX ORDER — ZOLPIDEM TARTRATE 10 MG/1
10 TABLET ORAL
Qty: 90 TABLET | Refills: 0 | Status: SHIPPED | OUTPATIENT
Start: 2025-05-27

## 2025-07-14 ENCOUNTER — HOSPITAL ENCOUNTER (OUTPATIENT)
Dept: RADIOLOGY | Facility: HOSPITAL | Age: 70
Discharge: HOME | End: 2025-07-14
Payer: MEDICARE

## 2025-07-14 DIAGNOSIS — M75.41 IMPINGEMENT SYNDROME OF RIGHT SHOULDER: ICD-10-CM

## 2025-07-14 DIAGNOSIS — M75.81 OTHER SHOULDER LESIONS, RIGHT SHOULDER: ICD-10-CM

## 2025-07-14 PROCEDURE — 73221 MRI JOINT UPR EXTREM W/O DYE: CPT
